# Patient Record
Sex: FEMALE | Race: WHITE | NOT HISPANIC OR LATINO | Employment: FULL TIME | URBAN - METROPOLITAN AREA
[De-identification: names, ages, dates, MRNs, and addresses within clinical notes are randomized per-mention and may not be internally consistent; named-entity substitution may affect disease eponyms.]

---

## 2018-11-12 ENCOUNTER — OFFICE VISIT (OUTPATIENT)
Dept: FAMILY MEDICINE | Facility: CLINIC | Age: 28
End: 2018-11-12
Payer: COMMERCIAL

## 2018-11-12 VITALS
HEART RATE: 68 BPM | RESPIRATION RATE: 14 BRPM | SYSTOLIC BLOOD PRESSURE: 106 MMHG | DIASTOLIC BLOOD PRESSURE: 64 MMHG | BODY MASS INDEX: 27.36 KG/M2 | TEMPERATURE: 98.2 F | WEIGHT: 176 LBS | OXYGEN SATURATION: 97 %

## 2018-11-12 DIAGNOSIS — Z00.00 ENCOUNTER FOR ROUTINE ADULT HEALTH EXAMINATION WITHOUT ABNORMAL FINDINGS: Primary | ICD-10-CM

## 2018-11-12 DIAGNOSIS — R41.840 INATTENTION: ICD-10-CM

## 2018-11-12 DIAGNOSIS — R45.86 MOOD SWINGS: ICD-10-CM

## 2018-11-12 DIAGNOSIS — Z12.4 SCREENING FOR CERVICAL CANCER: ICD-10-CM

## 2018-11-12 PROCEDURE — 87624 HPV HI-RISK TYP POOLED RSLT: CPT | Performed by: FAMILY MEDICINE

## 2018-11-12 PROCEDURE — 99395 PREV VISIT EST AGE 18-39: CPT | Performed by: FAMILY MEDICINE

## 2018-11-12 PROCEDURE — G0145 SCR C/V CYTO,THINLAYER,RESCR: HCPCS | Performed by: FAMILY MEDICINE

## 2018-11-12 RX ORDER — BUPROPION HYDROCHLORIDE 150 MG/1
150 TABLET, EXTENDED RELEASE ORAL DAILY
Qty: 30 TABLET | Refills: 6 | Status: SHIPPED | OUTPATIENT
Start: 2018-11-12 | End: 2020-10-28

## 2018-11-12 ASSESSMENT — ANXIETY QUESTIONNAIRES
7. FEELING AFRAID AS IF SOMETHING AWFUL MIGHT HAPPEN: NOT AT ALL
1. FEELING NERVOUS, ANXIOUS, OR ON EDGE: SEVERAL DAYS
5. BEING SO RESTLESS THAT IT IS HARD TO SIT STILL: NOT AT ALL
6. BECOMING EASILY ANNOYED OR IRRITABLE: MORE THAN HALF THE DAYS
GAD7 TOTAL SCORE: 6
2. NOT BEING ABLE TO STOP OR CONTROL WORRYING: NOT AT ALL
3. WORRYING TOO MUCH ABOUT DIFFERENT THINGS: SEVERAL DAYS

## 2018-11-12 ASSESSMENT — PATIENT HEALTH QUESTIONNAIRE - PHQ9
5. POOR APPETITE OR OVEREATING: MORE THAN HALF THE DAYS
SUM OF ALL RESPONSES TO PHQ QUESTIONS 1-9: 11
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
## 2018-11-12 NOTE — MR AVS SNAPSHOT
After Visit Summary   11/12/2018    Antonietta Roy    MRN: [id number]           Patient Information     Date Of Birth          1990        Visit Information        Provider Department      11/12/2018 10:00 AM Chalo Vo MD [facility]        Today's Diagnoses     Screening for cervical cancer    -  1    Mood swings        Inattention          Care Instructions      Preventive Health Recommendations  Female Ages 26 - 39  Yearly exam:   See your health care provider every year in order to    Review health changes.     Discuss preventive care.      Review your medicines if you your doctor has prescribed any.    Until age 30: Get a Pap test every three years (more often if you have had an abnormal result).    After age 30: Talk to your doctor about whether you should have a Pap test every 3 years or have a Pap test with HPV screening every 5 years.   You do not need a Pap test if your uterus was removed (hysterectomy) and you have not had cancer.  You should be tested each year for STDs (sexually transmitted diseases), if you're at risk.   Talk to your provider about how often to have your cholesterol checked.  If you are at risk for diabetes, you should have a diabetes test (fasting glucose).  Shots: Get a flu shot each year. Get a tetanus shot every 10 years.   Nutrition:     Eat at least 5 servings of fruits and vegetables each day.    Eat whole-grain bread, whole-wheat pasta and brown rice instead of white grains and rice.    Get adequate Calcium and Vitamin D.     Lifestyle    Exercise at least 150 minutes a week (30 minutes a day, 5 days of the week). This will help you control your weight and prevent disease.    Limit alcohol to one drink per day.    No smoking.     Wear sunscreen to prevent skin cancer.    See your dentist every six months for an exam and cleaning.            Follow-ups after your visit        Additional Services     MENTAL HEALTH REFERRAL  -  "Adult; Assessments and Testing; ADHD; FMG: [facility] [phone]; We will contact you to schedule the appointment or please call with any questions       All scheduling is subject to the client's specific insurance plan & benefits, provider/location availability, and provider clinical specialities.  Please arrive 15 minutes early for your first appointment and bring your completed paperwork.    Please be aware that coverage of these services is subject to the terms and limitations of your health insurance plan.  Call member services at your health plan with any benefit or coverage questions.                            Follow-up notes from your care team     Return in about 1 year (around 11/12/2019) for Physical Exam.      Who to contact     If you have questions or need follow up information about today's clinic visit or your schedule please contact [facility] directly at [phone].  Normal or non-critical lab and imaging results will be communicated to you by MyChart, letter or phone within 4 business days after the clinic has received the results. If you do not hear from us within 7 days, please contact the clinic through MyChart or phone. If you have a critical or abnormal lab result, we will notify you by phone as soon as possible.  Submit refill requests through [employer] or call your pharmacy and they will forward the refill request to us. Please allow 3 business days for your refill to be completed.          Additional Information About Your Visit        [employer] Information     [employer] lets you send messages to your doctor, view your test results, renew your prescriptions, schedule appointments and more. To sign up, go to [email or web address] . Click on \"Log in\" on the left side of the screen, which will take you to the Welcome page. Then click on \"Sign up Now\" on the right side of the page.     You will be asked to enter the access code listed below, as well as some " personal information. Please follow the directions to create your username and password.     Your access code is: [email or web address]  Expires: 2/10/2019  9:48 AM     Your access code will  in 90 days. If you need help or a new code, please call your [place] clinic or [phone].        Care EveryWhere ID     This is your Care EveryWhere ID. This could be used by other organizations to access your [place] medical records  [id number]        Your Vitals Were     Pulse Temperature Respirations Last Period Pulse Oximetry BMI (Body Mass Index)    68 98.2  F (36.8  C) (Tympanic) 14 10/29/2018 (Exact Date) 97% 27.36 kg/m2       Blood Pressure from Last 3 Encounters:   18 106/64   04/26/10 119/71   10/01/09 112/72    Weight from Last 3 Encounters:   18 176 lb (79.8 kg)   04/26/10 157 lb (71.2 kg) (86 %)*   10/01/09 164 lb 12.8 oz (74.8 kg) (91 %)*     * Growth percentiles are based on [facility] 2-20 Years data.              We Performed the Following     MENTAL HEALTH REFERRAL  - Adult; Assessments and Testing; ADHD; FMG: [facility] [phone]; We will contact you to schedule the appointment or please call with any questions     Pap imaged thin layer screen with HPV - recommended age 30 - 65 years (select HPV order below)          Today's Medication Changes          These changes are accurate as of 18 10:46 AM.  If you have any questions, ask your nurse or doctor.               Start taking these medicines.        Dose/Directions    buPROPion 150 MG 12 hr tablet   Commonly known as:  WELLBUTRIN SR   Used for:  Mood swings   Started by:  Chalo Vo MD        Dose:  150 mg   Take 1 tablet (150 mg) by mouth daily   Quantity:  30 tablet   Refills:  6            Where to get your medicines      These medications were sent to [id number] - [address] [facility], [facility] [address]     Phone:  [phone]      buPROPion 150 MG 12 hr tablet                Primary Care Provider Office Phone # Fax #    Francia Meyer, ABHI [phone]       XXX RESIGNED XXX [address] [facility] [address]        Equal Access to Services     BEHZAD FINLEY : Hadii aad ku hadasho Soomaali, waaxda luqadaha, qaybta kaalmada adeegyada, waxay idiin hayaan adeeg khbhavikjudi ramirez. So [facility] [phone].    ATENCIÓN: Si habla español, tiene a zimmer disposición servicios gratuitos de asistencia lingüística. Llame al [phone].    We comply with applicable federal civil rights laws and [place] laws. We do not discriminate on the basis of race, color, national origin, age, disability, sex, sexual orientation, or gender identity.            Thank you!     Thank you for choosing [facility]  for your care. Our goal is always to provide you with excellent care. Hearing back from our patients is one way we can continue to improve our services. Please take a few minutes to complete the written survey that you may receive in the mail after your visit with us. Thank you!             Your Updated Medication List - Protect others around you: Learn how to safely use, store and throw away your medicines at www.disposemymeds.org.          This list is accurate as of 11/12/18 10:46 AM.  Always use your most recent med list.                   Brand Name Dispense Instructions for use Diagnosis    buPROPion 150 MG 12 hr tablet    WELLBUTRIN SR    30 tablet    Take 1 tablet (150 mg) by mouth daily    Mood swings       NO ACTIVE MEDICATIONS      .

## 2018-11-12 NOTE — LETTER
November 20, 2018    Antonietta Roy  [address] [address]    Dear Antonietta,  We are happy to inform you that your PAP smear result from 11/12/18 is normal.  We are now able to do a follow up test on PAP smears. The DNA test is for HPV (Human Papilloma Virus). Cervical cancer is closely linked with certain types of HPV. Your results showed no evidence of high risk HPV.  Therefore we recommend you return in 3 years for your next pap smear.  You will still need to return to the clinic every year for an annual exam and other preventive tests.  If you have additional questions regarding this result, please call our registered nurse, Antonietta at [phone].  Sincerely,    Chalo Vo MD/bishnu

## 2018-11-13 ASSESSMENT — ANXIETY QUESTIONNAIRES: GAD7 TOTAL SCORE: 6

## 2018-11-14 LAB
COPATH REPORT: NORMAL
PAP: NORMAL

## 2018-11-18 LAB
FINAL DIAGNOSIS: NORMAL
HPV HR 12 DNA CVX QL NAA+PROBE: NEGATIVE
HPV16 DNA SPEC QL NAA+PROBE: NEGATIVE
HPV18 DNA SPEC QL NAA+PROBE: NEGATIVE
SPECIMEN DESCRIPTION: NORMAL
SPECIMEN SOURCE CVX/VAG CYTO: NORMAL

## 2019-01-07 ENCOUNTER — OFFICE VISIT (OUTPATIENT)
Dept: PSYCHOLOGY | Facility: CLINIC | Age: 29
End: 2019-01-07
Payer: COMMERCIAL

## 2019-01-07 DIAGNOSIS — F41.1 GENERALIZED ANXIETY DISORDER: Primary | ICD-10-CM

## 2019-01-07 PROCEDURE — 90834 PSYTX W PT 45 MINUTES: CPT | Performed by: PSYCHOLOGIST

## 2019-01-07 ASSESSMENT — ANXIETY QUESTIONNAIRES
IF YOU CHECKED OFF ANY PROBLEMS ON THIS QUESTIONNAIRE, HOW DIFFICULT HAVE THESE PROBLEMS MADE IT FOR YOU TO DO YOUR WORK, TAKE CARE OF THINGS AT HOME, OR GET ALONG WITH OTHER PEOPLE: SOMEWHAT DIFFICULT
7. FEELING AFRAID AS IF SOMETHING AWFUL MIGHT HAPPEN: NOT AT ALL
3. WORRYING TOO MUCH ABOUT DIFFERENT THINGS: SEVERAL DAYS
1. FEELING NERVOUS, ANXIOUS, OR ON EDGE: SEVERAL DAYS
6. BECOMING EASILY ANNOYED OR IRRITABLE: NEARLY EVERY DAY
5. BEING SO RESTLESS THAT IT IS HARD TO SIT STILL: NOT AT ALL
GAD7 TOTAL SCORE: 8
2. NOT BEING ABLE TO STOP OR CONTROL WORRYING: SEVERAL DAYS

## 2019-01-07 ASSESSMENT — PATIENT HEALTH QUESTIONNAIRE - PHQ9
5. POOR APPETITE OR OVEREATING: MORE THAN HALF THE DAYS
SUM OF ALL RESPONSES TO PHQ QUESTIONS 1-9: 8
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
## 2019-01-09 ASSESSMENT — ANXIETY QUESTIONNAIRES: GAD7 TOTAL SCORE: 8

## 2019-03-18 ENCOUNTER — OFFICE VISIT (OUTPATIENT)
Dept: PSYCHOLOGY | Facility: CLINIC | Age: 29
End: 2019-03-18
Payer: COMMERCIAL

## 2019-03-18 DIAGNOSIS — F41.1 GENERALIZED ANXIETY DISORDER: Primary | ICD-10-CM

## 2019-03-18 PROCEDURE — 90834 PSYTX W PT 45 MINUTES: CPT | Performed by: PSYCHOLOGIST

## 2019-03-21 NOTE — PROGRESS NOTES
Progress Note - Initial Session  Disclaimer: This note consists of symbols derived from keyboarding, dictation and/or voice recognition software. As a result, there may be errors in the script that have gone undetected. Please consider this when interpreting information found in this chart.    Client Name:  Antonietta Roy Date: 3/18/2019         Service Type: Individual  Video Visit: No     Session Start Time: 4:00 PM  session End Time: 4:45 PM     Session Length: 45    Session #: 1    Attendees: Client attended alone     DATA:  Client is referred by Aparna Berger PhD in order to complete ADHD evaluation.  See [facility] extended documentation dated 1/7/2019 for initial intake.  I will complete this and finish interpreting testing.   Interactive Complexity: No  Crisis: No    Intervention:  Psychological assessment    ASSESSMENT:  Mental Status Assessment:  Appearance:   Appropriate   Eye Contact:   Good   Psychomotor Behavior: Restless   Attitude:   Cooperative   Orientation:   All  Speech   Rate / Production: Normal    Volume:  Normal   Mood:    Normal  Affect:    Appropriate   Thought Content:  Clear   Thought Form:  Coherent  Logical   Insight:    Good       Safety Issues and Plan for Safety and Risk Management:  Client denies current fears or concerns for personal safety.  Client denies current or recent suicidal ideation or behaviors.  Client denies current or recent homicidal ideation or behaviors.  Client denies current or recent self injurious behavior or ideation.  Client denies other safety concerns.  A safety and risk management plan has not been developed at this time, however client was given the after-hours number / 911 should there be a change in any of these risk factors.  Client reports there are no firearms in the house.      Diagnostic Criteria:  A. Excessive anxiety and worry about a number of events or activities (such as work or school performance).    - Restlessness or feeling  keyed up or on edge.    - Irritability.    - Muscle tension.   D. The focus of the anxiety and worry is not confined to features of an Axis I disorder.  E. The anxiety, worry, or physical symptoms cause clinically significant distress or impairment in social, occupational, or other important areas of functioning.   F. The disturbance is not due to the direct physiological effects of a substance (e.g., a drug of abuse, a medication) or a general medical condition (e.g., hyperthyroidism) and does not occur exclusively during a Mood Disorder, a Psychotic Disorder, or a Pervasive Developmental Disorder.    - The aformentioned symptoms began in childhood year(s) ago and occurs 7 days per week and is experienced as moderate.      DSM5 Diagnoses: (Sustained by DSM5 Criteria Listed Above)  Diagnoses: 300.02 (F41.1) Generalized Anxiety Disorder  Psychosocial & Contextual Factors: Rule out PTSD, rule out ADHD; history of trauma; marital difficulties.  WHODAS 2.0 (12 item)           See [facility] documentation dated 1/7/2019  Collateral Reports Completed:  Authorization for Release of Information Completed for Antonietta Hobson      PLAN: (Homework, other):  Client stated that she may follow up for ongoing services with [facility].  Follow-up appointments have been set I will contact points previous therapist.      Maximo Mcdonald

## 2019-04-23 ENCOUNTER — OFFICE VISIT (OUTPATIENT)
Dept: PSYCHOLOGY | Facility: CLINIC | Age: 29
End: 2019-04-23
Payer: COMMERCIAL

## 2019-04-23 DIAGNOSIS — F41.1 GENERALIZED ANXIETY DISORDER: Primary | ICD-10-CM

## 2019-04-23 PROCEDURE — 96130 PSYCL TST EVAL PHYS/QHP 1ST: CPT | Performed by: PSYCHOLOGIST

## 2019-04-23 PROCEDURE — 90834 PSYTX W PT 45 MINUTES: CPT | Performed by: PSYCHOLOGIST

## 2019-04-23 NOTE — Clinical Note
I apologize, looking through my notes I note that she took Wellbutrin for 2 weeks and did not experience any noticeable relief.  I suggested that might have been too short of a trial.

## 2019-04-30 NOTE — PROGRESS NOTES
Progress Note  Disclaimer: This note consists of symbols derived from keyboarding, dictation and/or voice recognition software. As a result, there may be errors in the script that have gone undetected. Please consider this when interpreting information found in this chart.    Client Name: Antonietta Roy  Date: 4/23/2019         Service Type: Individual      Session Start Time: 3:00 PM  session End Time: 40 5 PM      Session Length: 45     Session #: 2     Attendees: Client attended alone    Treatment Plan Last Reviewed: 4/23/2019  PHQ-9 / ROSALVA-7 : See flow sheets     DATA   ADHD Assessment feedback session. Reviewed results of testing. See [facility] extended documentation for results. Explained diagnosis and treatment options.  Results of testing do not indicate ADHD.  Difficulties most likely arise from either generalized anxiety disorder or posttraumatic stress disorder.  Refer client to PCP for possible medication adjustment as well as back to individual therapy.   Progress Since Last Session (Related to Symptoms / Goals / Homework):   Symptoms: No change Stable    Homework: Achieved / completed to satisfaction      Episode of Care Goals: Satisfactory progress - ACTION (Actively working towards change); Intervened by reinforcing change plan / affirming steps taken     Current / Ongoing Stressors and Concerns:   Difficulty with attention and concentration     Treatment Objective(s) Addressed in This Session:   Reviewed results of testing, provided ADHD Psychoeducation tips and resources, encouraged client to make appointment for medication evaluation.       Intervention:   psychoeducation regarding ADHD        ASSESSMENT: Current Emotional / Mental Status (status of significant symptoms):   Risk status (Self / Other harm or suicidal ideation)   Client denies current fears or concerns for personal safety.   Client denies current or recent suicidal ideation or  behaviors.   Client denies current or recent homicidal ideation or behaviors.   Client denies current or recent self injurious behavior or ideation.   Client denies other safety concerns.   A safety and risk management plan has not been developed at this time, however client was given the after-hours number / 911 should there be a change in any of these risk factors.     Appearance:   Appropriate    Eye Contact:   Good    Psychomotor Behavior: Normal    Attitude:   Cooperative    Orientation:   All   Speech    Rate / Production: Normal     Volume:  Normal    Mood:    Normal   Affect:    Appropriate    Thought Content:  Clear    Thought Form:  Coherent  Logical    Insight:    Good      Medication Review:   No changes to current psychiatric medication(s)     Medication Compliance:   Yes     Changes in Health Issues:   None reported     Chemical Use Review:   Substance Use: Chemical use reviewed, no active concerns identified      Tobacco Use: No current tobacco use.       Collateral Reports Completed:   Not Applicable    PLAN: (Client Tasks / Therapist Tasks / Other)  Client to contact PCP and individual therapist.  I will remain available for consultation if needed.        Maximo Mcdonald

## 2020-10-28 ENCOUNTER — OFFICE VISIT (OUTPATIENT)
Dept: FAMILY MEDICINE | Facility: CLINIC | Age: 30
End: 2020-10-28
Payer: COMMERCIAL

## 2020-10-28 VITALS
WEIGHT: 176 LBS | SYSTOLIC BLOOD PRESSURE: 104 MMHG | HEART RATE: 62 BPM | TEMPERATURE: 98.2 F | BODY MASS INDEX: 26.67 KG/M2 | DIASTOLIC BLOOD PRESSURE: 64 MMHG | RESPIRATION RATE: 16 BRPM | HEIGHT: 68 IN

## 2020-10-28 DIAGNOSIS — Z31.69 INFERTILITY COUNSELING: ICD-10-CM

## 2020-10-28 DIAGNOSIS — Z00.00 ROUTINE GENERAL MEDICAL EXAMINATION AT A HEALTH CARE FACILITY: Primary | ICD-10-CM

## 2020-10-28 DIAGNOSIS — R45.86 MOOD SWINGS: ICD-10-CM

## 2020-10-28 PROCEDURE — 99395 PREV VISIT EST AGE 18-39: CPT | Performed by: FAMILY MEDICINE

## 2020-10-28 ASSESSMENT — ENCOUNTER SYMPTOMS
ABDOMINAL PAIN: 0
FEVER: 0
DIZZINESS: 0
BREAST MASS: 0
JOINT SWELLING: 0
DIARRHEA: 0
NERVOUS/ANXIOUS: 0
CONSTIPATION: 0
EYE PAIN: 0
WEAKNESS: 0
HEADACHES: 0
DYSURIA: 0
MYALGIAS: 0
SHORTNESS OF BREATH: 0
ARTHRALGIAS: 0
HEMATURIA: 0
SORE THROAT: 0
FREQUENCY: 0
PARESTHESIAS: 0
CHILLS: 0
PALPITATIONS: 0
HEARTBURN: 0
COUGH: 0
NAUSEA: 0
HEMATOCHEZIA: 0

## 2020-10-28 ASSESSMENT — ANXIETY QUESTIONNAIRES
7. FEELING AFRAID AS IF SOMETHING AWFUL MIGHT HAPPEN: NOT AT ALL
6. BECOMING EASILY ANNOYED OR IRRITABLE: NEARLY EVERY DAY
3. WORRYING TOO MUCH ABOUT DIFFERENT THINGS: SEVERAL DAYS
5. BEING SO RESTLESS THAT IT IS HARD TO SIT STILL: MORE THAN HALF THE DAYS
GAD7 TOTAL SCORE: 12
2. NOT BEING ABLE TO STOP OR CONTROL WORRYING: SEVERAL DAYS
1. FEELING NERVOUS, ANXIOUS, OR ON EDGE: MORE THAN HALF THE DAYS

## 2020-10-28 ASSESSMENT — PATIENT HEALTH QUESTIONNAIRE - PHQ9
SUM OF ALL RESPONSES TO PHQ QUESTIONS 1-9: 12
5. POOR APPETITE OR OVEREATING: NEARLY EVERY DAY

## 2020-10-28 ASSESSMENT — MIFFLIN-ST. JEOR: SCORE: 1558.89

## 2020-10-28 NOTE — NURSING NOTE
"Chief Complaint   Patient presents with     Physical       Initial /64   Pulse 62   Temp 98.2  F (36.8  C) (Tympanic)   Resp 16   Ht 1.715 m (5' 7.5\")   Wt 79.8 kg (176 lb)   LMP 10/20/2020   Breastfeeding No   BMI 27.16 kg/m   Estimated body mass index is 27.16 kg/m  as calculated from the following:    Height as of this encounter: 1.715 m (5' 7.5\").    Weight as of this encounter: 79.8 kg (176 lb).    Patient presents to the clinic using     Health Maintenance that is potentially due pending provider review:          Is there anyone who you would like to be able to receive your results?   If yes have patient fill out JEAN CARLOS    "
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
## 2020-10-29 ASSESSMENT — ANXIETY QUESTIONNAIRES: GAD7 TOTAL SCORE: 12

## 2021-01-15 ENCOUNTER — NURSE TRIAGE (OUTPATIENT)
Dept: NURSING | Facility: CLINIC | Age: 31
End: 2021-01-15
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
## 2021-02-07 ENCOUNTER — HEALTH MAINTENANCE LETTER (OUTPATIENT)
Age: 31
End: 2021-02-07

## 2021-09-12 ENCOUNTER — HEALTH MAINTENANCE LETTER (OUTPATIENT)
Age: 31
End: 2021-09-12

## 2022-01-02 ENCOUNTER — HEALTH MAINTENANCE LETTER (OUTPATIENT)
Age: 32
End: 2022-01-02

## 2022-11-19 ENCOUNTER — HEALTH MAINTENANCE LETTER (OUTPATIENT)
Age: 32
End: 2022-11-19

## 2023-04-09 ENCOUNTER — HEALTH MAINTENANCE LETTER (OUTPATIENT)
Age: 33
End: 2023-04-09

## 2023-11-09 ENCOUNTER — OFFICE VISIT (OUTPATIENT)
Dept: FAMILY MEDICINE | Facility: CLINIC | Age: 33
End: 2023-11-09
Payer: COMMERCIAL

## 2023-11-09 VITALS
TEMPERATURE: 97.4 F | HEIGHT: 68 IN | OXYGEN SATURATION: 97 % | DIASTOLIC BLOOD PRESSURE: 89 MMHG | BODY MASS INDEX: 31.46 KG/M2 | RESPIRATION RATE: 20 BRPM | SYSTOLIC BLOOD PRESSURE: 130 MMHG | HEART RATE: 81 BPM | WEIGHT: 207.6 LBS

## 2023-11-09 DIAGNOSIS — R11.0 NAUSEA: ICD-10-CM

## 2023-11-09 DIAGNOSIS — R00.0 RACING HEART BEAT: ICD-10-CM

## 2023-11-09 DIAGNOSIS — R42 DIZZINESS: Primary | ICD-10-CM

## 2023-11-09 PROBLEM — F41.9 ANXIETY AND DEPRESSION: Status: ACTIVE | Noted: 2022-07-28

## 2023-11-09 PROBLEM — F32.A ANXIETY AND DEPRESSION: Status: ACTIVE | Noted: 2022-07-28

## 2023-11-09 LAB
ALBUMIN SERPL BCG-MCNC: 4.7 G/DL (ref 3.5–5.2)
ALBUMIN UR-MCNC: 30 MG/DL
ALP SERPL-CCNC: 55 U/L (ref 35–104)
ALT SERPL W P-5'-P-CCNC: 7 U/L (ref 0–50)
ANION GAP SERPL CALCULATED.3IONS-SCNC: 12 MMOL/L (ref 7–15)
APPEARANCE UR: CLEAR
AST SERPL W P-5'-P-CCNC: 20 U/L (ref 0–45)
BACTERIA #/AREA URNS HPF: ABNORMAL /HPF
BILIRUB SERPL-MCNC: 0.8 MG/DL
BILIRUB UR QL STRIP: ABNORMAL
BUN SERPL-MCNC: 6.1 MG/DL (ref 6–20)
CALCIUM SERPL-MCNC: 9.9 MG/DL (ref 8.6–10)
CHLORIDE SERPL-SCNC: 103 MMOL/L (ref 98–107)
COLOR UR AUTO: YELLOW
CREAT SERPL-MCNC: 0.92 MG/DL (ref 0.51–0.95)
DEPRECATED HCO3 PLAS-SCNC: 23 MMOL/L (ref 22–29)
EGFRCR SERPLBLD CKD-EPI 2021: 84 ML/MIN/1.73M2
ERYTHROCYTE [DISTWIDTH] IN BLOOD BY AUTOMATED COUNT: 12.5 % (ref 10–15)
FERRITIN SERPL-MCNC: 55 NG/ML (ref 6–175)
GLUCOSE SERPL-MCNC: 123 MG/DL (ref 70–99)
GLUCOSE UR STRIP-MCNC: NEGATIVE MG/DL
HBA1C MFR BLD: 5.2 % (ref 0–5.6)
HCG UR QL: NEGATIVE
HCT VFR BLD AUTO: 44.5 % (ref 35–47)
HGB BLD-MCNC: 15.1 G/DL (ref 11.7–15.7)
HGB UR QL STRIP: ABNORMAL
IRON BINDING CAPACITY (ROCHE): 285 UG/DL (ref 240–430)
IRON SATN MFR SERPL: 57 % (ref 15–46)
IRON SERPL-MCNC: 162 UG/DL (ref 37–145)
KETONES UR STRIP-MCNC: 40 MG/DL
LEUKOCYTE ESTERASE UR QL STRIP: NEGATIVE
MCH RBC QN AUTO: 29.7 PG (ref 26.5–33)
MCHC RBC AUTO-ENTMCNC: 33.9 G/DL (ref 31.5–36.5)
MCV RBC AUTO: 87 FL (ref 78–100)
MUCOUS THREADS #/AREA URNS LPF: PRESENT /LPF
NITRATE UR QL: NEGATIVE
PH UR STRIP: 6 [PH] (ref 5–7)
PLATELET # BLD AUTO: 260 10E3/UL (ref 150–450)
POTASSIUM SERPL-SCNC: 3.9 MMOL/L (ref 3.4–5.3)
PROT SERPL-MCNC: 7.8 G/DL (ref 6.4–8.3)
RBC # BLD AUTO: 5.09 10E6/UL (ref 3.8–5.2)
RBC #/AREA URNS AUTO: ABNORMAL /HPF
SODIUM SERPL-SCNC: 138 MMOL/L (ref 135–145)
SP GR UR STRIP: >=1.03 (ref 1–1.03)
TSH SERPL DL<=0.005 MIU/L-ACNC: 1.08 UIU/ML (ref 0.3–4.2)
UROBILINOGEN UR STRIP-ACNC: 0.2 E.U./DL
WBC # BLD AUTO: 13.8 10E3/UL (ref 4–11)
WBC #/AREA URNS AUTO: ABNORMAL /HPF

## 2023-11-09 PROCEDURE — 84443 ASSAY THYROID STIM HORMONE: CPT | Performed by: STUDENT IN AN ORGANIZED HEALTH CARE EDUCATION/TRAINING PROGRAM

## 2023-11-09 PROCEDURE — 81001 URINALYSIS AUTO W/SCOPE: CPT | Performed by: STUDENT IN AN ORGANIZED HEALTH CARE EDUCATION/TRAINING PROGRAM

## 2023-11-09 PROCEDURE — 99214 OFFICE O/P EST MOD 30 MIN: CPT | Performed by: STUDENT IN AN ORGANIZED HEALTH CARE EDUCATION/TRAINING PROGRAM

## 2023-11-09 PROCEDURE — 83550 IRON BINDING TEST: CPT | Performed by: STUDENT IN AN ORGANIZED HEALTH CARE EDUCATION/TRAINING PROGRAM

## 2023-11-09 PROCEDURE — 93000 ELECTROCARDIOGRAM COMPLETE: CPT | Performed by: STUDENT IN AN ORGANIZED HEALTH CARE EDUCATION/TRAINING PROGRAM

## 2023-11-09 PROCEDURE — 36415 COLL VENOUS BLD VENIPUNCTURE: CPT | Performed by: STUDENT IN AN ORGANIZED HEALTH CARE EDUCATION/TRAINING PROGRAM

## 2023-11-09 PROCEDURE — 85027 COMPLETE CBC AUTOMATED: CPT | Performed by: STUDENT IN AN ORGANIZED HEALTH CARE EDUCATION/TRAINING PROGRAM

## 2023-11-09 PROCEDURE — 80053 COMPREHEN METABOLIC PANEL: CPT | Performed by: STUDENT IN AN ORGANIZED HEALTH CARE EDUCATION/TRAINING PROGRAM

## 2023-11-09 PROCEDURE — 81025 URINE PREGNANCY TEST: CPT | Performed by: STUDENT IN AN ORGANIZED HEALTH CARE EDUCATION/TRAINING PROGRAM

## 2023-11-09 PROCEDURE — 83036 HEMOGLOBIN GLYCOSYLATED A1C: CPT | Performed by: STUDENT IN AN ORGANIZED HEALTH CARE EDUCATION/TRAINING PROGRAM

## 2023-11-09 PROCEDURE — 82728 ASSAY OF FERRITIN: CPT | Performed by: STUDENT IN AN ORGANIZED HEALTH CARE EDUCATION/TRAINING PROGRAM

## 2023-11-09 PROCEDURE — 83540 ASSAY OF IRON: CPT | Performed by: STUDENT IN AN ORGANIZED HEALTH CARE EDUCATION/TRAINING PROGRAM

## 2023-11-09 RX ORDER — BUSPIRONE HYDROCHLORIDE 15 MG/1
1 TABLET ORAL
COMMUNITY
Start: 2023-10-30 | End: 2023-11-10

## 2023-11-09 RX ORDER — ONDANSETRON 4 MG/1
4 TABLET, ORALLY DISINTEGRATING ORAL EVERY 8 HOURS PRN
Qty: 15 TABLET | Refills: 0 | Status: SHIPPED | OUTPATIENT
Start: 2023-11-09

## 2023-11-09 RX ORDER — CLONAZEPAM 0.5 MG/1
TABLET ORAL
COMMUNITY
Start: 2023-10-30 | End: 2023-11-10

## 2023-11-09 RX ORDER — HYDROXYZINE HYDROCHLORIDE 10 MG/1
10-20 TABLET, FILM COATED ORAL
COMMUNITY
Start: 2022-10-31 | End: 2023-11-09

## 2023-11-09 RX ORDER — PROPRANOLOL HYDROCHLORIDE 10 MG/1
TABLET ORAL
COMMUNITY
Start: 2023-07-03 | End: 2023-11-09

## 2023-11-09 RX ORDER — TRAZODONE HYDROCHLORIDE 50 MG/1
50-100 TABLET, FILM COATED ORAL AT BEDTIME
COMMUNITY
Start: 2023-02-08 | End: 2023-11-09

## 2023-11-09 RX ORDER — BUSPIRONE HYDROCHLORIDE 10 MG/1
TABLET ORAL
COMMUNITY
Start: 2023-11-08 | End: 2023-11-09

## 2023-11-09 RX ORDER — FLUVOXAMINE MALEATE 50 MG
50 TABLET ORAL AT BEDTIME
COMMUNITY
Start: 2023-04-06 | End: 2023-11-09

## 2023-11-09 ASSESSMENT — PAIN SCALES - GENERAL: PAINLEVEL: NO PAIN (0)

## 2023-11-09 NOTE — PROGRESS NOTES
Assessment & Plan     Dizziness  Racing heart beat  Racing heart beat, dizziness, decreased appetite w/nausea for 3 days. Having worsening anxiety/panic attacks that she is working with her psychiatrist on. Recently increased buspar dose and added PRN klonopin. Denies CP, SOB, fever/chills, ab pain, urinary symptoms. Vitals and exam WNL-no tachycardia or low O2. Differential includes: anxiety/panic attacks, thyroid, diabetes, electrolyte abnormality, pregnancy, UTI, anemia, ect...     EKG NSR. Labs ordered below. Recommend increased fluids. Try small/bland meals. Zofran given for nausea. Will schedule zio patch if symptoms not improving. Strong suspicion this is anxiety-advised to follow up with psychiatrist regarding medications.    - TSH with free T4 reflex  - CBC with platelets  - Ferritin  - Iron and iron binding capacity  - Comprehensive metabolic panel  - Hemoglobin A1c  - EKG 12-lead complete w/read - Clinics  - HCG qualitative urine  - UA Macroscopic with reflex to Microscopic and Culture  - zio patch      Christopher Mckeon DO  [facility]    Angel Mane is a 33 year old, presenting for the following health issues:  Dizziness (Light headed , fatigue, and not having appetite.)        11/9/2023     8:02 AM   Additional Questions   Roomed by Stella esparza   Accompanied by liliana dawn       History of Present Illness       Reason for visit:  Racing heart, fatigue,loss of appetite,dizziness\lightheadedness  Symptom onset:  1-3 days ago    She eats 2-3 servings of fruits and vegetables daily.She consumes 0 sweetened beverage(s) daily.She exercises with enough effort to increase her heart rate 10 to 19 minutes per day.  She exercises with enough effort to increase her heart rate 3 or less days per week.   She is taking medications regularly.     Racing heart beat for last few days (3 days ago)  Spontaneous, no known trigger  Severe dizziness, constant  Not comfortable  "walking  Notices this sitting, standing, walking  Dec appetite  Sensation of eating causes gagging/nausea  Fatigue  Cold a few weeks ago  Maybe some diarrhea, mild    Takes buspar (recently inc from 10-15mg); not pleasant (however has tried many meds in the past)  -seeing psychiatrist, saw recently    Klonopin (not regularly, PRN); took one yesterday, did not help    Chest feels heavy  Denies extra beats, SOB, headaches, vomiting, ab pain, constipation, urinary symptoms      Review of Systems   Constitutional, HEENT, cardiovascular, pulmonary, gi and gu systems are negative, except as otherwise noted.      Objective    /89 (BP Location: Right arm, Patient Position: Sitting, Cuff Size: Adult Regular)   Pulse 81   Temp 97.4  F (36.3  C) (Temporal)   Resp 20   Ht 1.725 m (5' 7.9\")   Wt 94.2 kg (207 lb 9.6 oz)   LMP 10/29/2023 (Exact Date)   SpO2 97%   BMI 31.66 kg/m    Body mass index is 31.66 kg/m .    Physical Exam  Constitutional:       Appearance: Normal appearance.   HENT:      Right Ear: Tympanic membrane, ear canal and external ear normal.      Left Ear: Tympanic membrane, ear canal and external ear normal.      Mouth/Throat:      Mouth: Mucous membranes are moist.      Pharynx: Oropharynx is clear.   Eyes:      Extraocular Movements: Extraocular movements intact.      Conjunctiva/sclera: Conjunctivae normal.      Pupils: Pupils are equal, round, and reactive to light.   Cardiovascular:      Rate and Rhythm: Normal rate and regular rhythm.      Pulses: Normal pulses.      Heart sounds: Normal heart sounds. No murmur heard.  Pulmonary:      Effort: Pulmonary effort is normal. No respiratory distress.      Breath sounds: Normal breath sounds. No wheezing or rales.   Abdominal:      General: Abdomen is flat. There is no distension.      Palpations: Abdomen is soft.      Tenderness: There is no abdominal tenderness. There is no guarding or rebound.      Hernia: No hernia is present.   Musculoskeletal: "      Cervical back: Normal range of motion and neck supple. No rigidity.   Lymphadenopathy:      Cervical: No cervical adenopathy.   Skin:     Findings: No rash.   Neurological:      General: No focal deficit present.      Mental Status: She is alert and oriented to person, place, and time.   Psychiatric:         Attention and Perception: Attention normal.         Speech: Speech normal.         Thought Content: Thought content normal.         Cognition and Memory: Cognition normal.         Judgment: Judgment normal.      Comments: Kind, pleasant, cooperative. Tearful at times. Anxious and worried.             EKG Interpretation:      Interpreted by Christopher Mckeon DO  Symptoms at time of EKG: racing heart   Rhythm: Normal sinus   Rate: Normal  Axis: Normal  Ectopy: None  Conduction: Normal  ST Segments/ T Waves: No ST-T wave changes and No acute ischemic changes  Q Waves: None  Comparison to prior: No old EKG available  Clinical Impression: normal EKG

## 2023-11-09 NOTE — PATIENT INSTRUCTIONS
Your dizziness could be from anxiety/panic attacks, however let's make sure nothing else is going on. We will check labs for urine infection, pregnancy, anemia, thyroid abnormality, electrolyte abnormality, diabetes. I will mychart you when this is back.    Schedule the zio patch. This monitors the rhythm of your heart constantly for a few days. I will mychart you when this is completed.    Work on increasing fluids. Try eating small/bland meals. Move slowly from sit-stand. Rest. Follow up with psychiatrist if panic attacks are not getting better. You can always go to the ED if your symptoms are worsening, not improving.      Dr. Mckeon

## 2023-11-10 ENCOUNTER — NURSE TRIAGE (OUTPATIENT)
Dept: NURSING | Facility: CLINIC | Age: 33
End: 2023-11-10
Payer: COMMERCIAL

## 2023-11-10 ENCOUNTER — HOSPITAL ENCOUNTER (EMERGENCY)
Facility: CLINIC | Age: 33
Discharge: HOME | End: 2023-11-10
Attending: EMERGENCY MEDICINE | Admitting: EMERGENCY MEDICINE
Payer: COMMERCIAL

## 2023-11-10 VITALS
WEIGHT: 210 LBS | HEART RATE: 98 BPM | DIASTOLIC BLOOD PRESSURE: 95 MMHG | TEMPERATURE: 98 F | HEIGHT: 68 IN | OXYGEN SATURATION: 98 % | RESPIRATION RATE: 18 BRPM | SYSTOLIC BLOOD PRESSURE: 139 MMHG | BODY MASS INDEX: 31.83 KG/M2

## 2023-11-10 DIAGNOSIS — F41.9 ANXIETY: ICD-10-CM

## 2023-11-10 DIAGNOSIS — F41.0 PANIC ATTACK: ICD-10-CM

## 2023-11-10 LAB
ANION GAP SERPL CALCULATED.3IONS-SCNC: 13 MMOL/L (ref 7–15)
ATRIAL RATE - MUSE: 73 BPM
BASOPHILS # BLD AUTO: 0 10E3/UL (ref 0–0.2)
BASOPHILS NFR BLD AUTO: 0 %
BUN SERPL-MCNC: 7.3 MG/DL (ref 6–20)
CALCIUM SERPL-MCNC: 9.8 MG/DL (ref 8.6–10)
CHLORIDE SERPL-SCNC: 101 MMOL/L (ref 98–107)
CREAT SERPL-MCNC: 0.9 MG/DL (ref 0.51–0.95)
DEPRECATED HCO3 PLAS-SCNC: 24 MMOL/L (ref 22–29)
DIASTOLIC BLOOD PRESSURE - MUSE: NORMAL MMHG
EGFRCR SERPLBLD CKD-EPI 2021: 86 ML/MIN/1.73M2
EOSINOPHIL # BLD AUTO: 0.1 10E3/UL (ref 0–0.7)
EOSINOPHIL NFR BLD AUTO: 1 %
ERYTHROCYTE [DISTWIDTH] IN BLOOD BY AUTOMATED COUNT: 12.3 % (ref 10–15)
GLUCOSE SERPL-MCNC: 118 MG/DL (ref 70–99)
HCT VFR BLD AUTO: 45.1 % (ref 35–47)
HGB BLD-MCNC: 15.3 G/DL (ref 11.7–15.7)
IMM GRANULOCYTES # BLD: 0.1 10E3/UL
IMM GRANULOCYTES NFR BLD: 1 %
INTERPRETATION ECG - MUSE: NORMAL
LYMPHOCYTES # BLD AUTO: 1.7 10E3/UL (ref 0.8–5.3)
LYMPHOCYTES NFR BLD AUTO: 17 %
MCH RBC QN AUTO: 29.8 PG (ref 26.5–33)
MCHC RBC AUTO-ENTMCNC: 33.9 G/DL (ref 31.5–36.5)
MCV RBC AUTO: 88 FL (ref 78–100)
MONOCYTES # BLD AUTO: 0.6 10E3/UL (ref 0–1.3)
MONOCYTES NFR BLD AUTO: 6 %
NEUTROPHILS # BLD AUTO: 7.2 10E3/UL (ref 1.6–8.3)
NEUTROPHILS NFR BLD AUTO: 75 %
NRBC # BLD AUTO: 0 10E3/UL
NRBC BLD AUTO-RTO: 0 /100
P AXIS - MUSE: 82 DEGREES
PLATELET # BLD AUTO: 273 10E3/UL (ref 150–450)
POTASSIUM SERPL-SCNC: 3.8 MMOL/L (ref 3.4–5.3)
PR INTERVAL - MUSE: 132 MS
QRS DURATION - MUSE: 80 MS
QT - MUSE: 390 MS
QTC - MUSE: 429 MS
R AXIS - MUSE: 79 DEGREES
RBC # BLD AUTO: 5.14 10E6/UL (ref 3.8–5.2)
SODIUM SERPL-SCNC: 138 MMOL/L (ref 135–145)
SYSTOLIC BLOOD PRESSURE - MUSE: NORMAL MMHG
T AXIS - MUSE: 39 DEGREES
TROPONIN T SERPL HS-MCNC: <6 NG/L
VENTRICULAR RATE- MUSE: 73 BPM
WBC # BLD AUTO: 9.7 10E3/UL (ref 4–11)

## 2023-11-10 PROCEDURE — 250N000013 HC RX MED GY IP 250 OP 250 PS 637: Performed by: PSYCHIATRY & NEUROLOGY

## 2023-11-10 PROCEDURE — 80048 BASIC METABOLIC PNL TOTAL CA: CPT | Performed by: EMERGENCY MEDICINE

## 2023-11-10 PROCEDURE — 84484 ASSAY OF TROPONIN QUANT: CPT | Performed by: EMERGENCY MEDICINE

## 2023-11-10 PROCEDURE — 85025 COMPLETE CBC W/AUTO DIFF WBC: CPT | Performed by: EMERGENCY MEDICINE

## 2023-11-10 PROCEDURE — 99285 EMERGENCY DEPT VISIT HI MDM: CPT

## 2023-11-10 PROCEDURE — 36415 COLL VENOUS BLD VENIPUNCTURE: CPT | Performed by: EMERGENCY MEDICINE

## 2023-11-10 PROCEDURE — 93005 ELECTROCARDIOGRAM TRACING: CPT

## 2023-11-10 PROCEDURE — 99215 OFFICE O/P EST HI 40 MIN: CPT | Performed by: PSYCHIATRY & NEUROLOGY

## 2023-11-10 RX ORDER — LORAZEPAM 0.5 MG/1
.5-1 TABLET ORAL 2 TIMES DAILY PRN
Status: DISCONTINUED | OUTPATIENT
Start: 2023-11-10 | End: 2023-11-10 | Stop reason: HOSPADM

## 2023-11-10 RX ORDER — PROPRANOLOL HYDROCHLORIDE 10 MG/1
10 TABLET ORAL 2 TIMES DAILY
COMMUNITY
End: 2023-11-10

## 2023-11-10 RX ORDER — PROPRANOLOL HYDROCHLORIDE 10 MG/1
10 TABLET ORAL 2 TIMES DAILY
Qty: 60 TABLET | Refills: 0 | Status: SHIPPED | OUTPATIENT
Start: 2023-11-10 | End: 2023-11-14

## 2023-11-10 RX ORDER — PROPRANOLOL HYDROCHLORIDE 10 MG/1
10 TABLET ORAL 2 TIMES DAILY
Status: DISCONTINUED | OUTPATIENT
Start: 2023-11-10 | End: 2023-11-10 | Stop reason: HOSPADM

## 2023-11-10 RX ORDER — LORAZEPAM 1 MG/1
1 TABLET ORAL DAILY PRN
Qty: 15 TABLET | Refills: 0 | Status: SHIPPED | OUTPATIENT
Start: 2023-11-10

## 2023-11-10 RX ORDER — LORAZEPAM 1 MG/1
1 TABLET ORAL ONCE
Status: COMPLETED | OUTPATIENT
Start: 2023-11-10 | End: 2023-11-10

## 2023-11-10 RX ADMIN — LORAZEPAM 1 MG: 1 TABLET ORAL at 11:56

## 2023-11-10 RX ADMIN — PROPRANOLOL HYDROCHLORIDE 10 MG: 10 TABLET ORAL at 11:56

## 2023-11-10 ASSESSMENT — ACTIVITIES OF DAILY LIVING (ADL)
ADLS_ACUITY_SCORE: 35

## 2023-11-10 NOTE — ED TRIAGE NOTES
Pt was recently started on buprion  Pt has underly mental health  Pt thinks these are side effects   Pt was seen at [facility]  Pt was referred to come here for empath       Contracts for safety

## 2023-11-10 NOTE — CONSULTS
DEC Consult Order placed. DEC assessment completed by Alejandra on 11/10. Consult acknowledged and completed.     Denice Ralph LPCC
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
## 2023-11-10 NOTE — PROGRESS NOTES
Pt reports Ativan and propranolol were helpful in reducing anxiety. States the tight feeling in her chest has resolved.

## 2023-11-10 NOTE — DISCHARGE INSTRUCTIONS
You were scheduled a therapy appointment:  Date: Tuesday, 11/21/2023  Time: 9:00 am - 10:00 am  Provider: Muriel Lorenzo  Location: [facility], [address], [address], [place] [address]  Phone: [phone]  Type: Therapy - Initial (In-Person)    You were scheduled a psychiatry appointment  Date: Friday, 11/17/2023  Time: 9:00 am - 10:00 am  Provider: Ty Bynum  MSN  CNP,RN  Location: [facility], [address] [place] [address], [address], [place] [address]  Phone: [phone]  Type: Medication Mgmt - Initial (In-Person)    Patient Instructions  Please fill New Patient Form by using following link. All forms need to be completed 24hours prior to the appointment date/time by going to [email or web address] Please call us on [phone] 24 hours prior to your scheduled appointment to confirm that you are able to attend.    It is your responsibility to contact your insurance company directly to verify coverage, eligibility, payment, and benefit information for any appointments or referrals listed above. [facility] maintains an extensive network of licensed behavioral health providers to connect patients with the services they need. We do not charge providers a fee to participate in our referral network. We match patients with providers based on a patient's specific treatment needs, insurance coverage, and location. Our first effort will be to refer you to a provider within your care system, and will utilize providers outside your care system as needed    Aftercare Plan    Follow up with established providers and supports as scheduled. Continue taking medications as prescribed. Abstain from drugs and alcohol. Utilize your [facility] mental health crisis team as needed. They are available 24/7. Contact information is listed below.     If I am feeling unsafe or I am in a crisis, I will:   Contact my established care providers   Call the National Suicide Prevention Lifeline: 181.377.4370   Go to  the nearest emergency room   Call 911     Warning signs that I or other people might notice when a crisis is developing for me: changes to sleep, appetite or mood, increased anger, agitation or irritability, feeling depressed or hopeless, spending more time alone or talking less, increased crying, decreased productivity, seeing or hearing things that aren't there, thoughts of not wanting to live anymore or of actually killing myself, thoughts of hurting others    Things I am able to do on my own to cope or help me feel better: watching a favorite tv show or movie, listening to music I enjoy, going outside and breathing fresh air, going for a walk or exercising, taking a shower or bath, a cold or hot beverage, a healthy snack, drawing/coloring/painting, journaling, singing or dancing, deep breathing     I can try practicing square breathing when I begin to feel anxious - inhale through the nose for the count of 4 and the first line on the square. Exhale through the mouth for the count of 4 for the second line of the square. Repeat to complete the square. Repeat the square as many times as needed.    I can also use my five senses to practice mindfulness and grounding. What are five things I can see, four things I can hear, three things I can feel, two things I can smell, and one thing I can taste.     Things that I am able to do with others to cope or help me feel better: sometimes just talking or spending time with someone else, sharing a meal or having coffee, watching a movie or playing a game, going for a walk or exercising    I can also use community resources including mental health hotlines, [facility] crisis teams, or apps.     Things I can use or do for distraction: movies/tv, music, reading, games, drawing/coloring/painting or other art, essential oils, exercise, cleaning/organizing, puzzles, crossword puzzles, word search, Sudoku       I can also download a meditation or relaxation antwon, like Calm, Headspace, or  "Insight Timer (all three offer a free version)    A great website resource is Change to Chill with active coping skills    Changes I can make to support my mental health and wellness: Attend scheduled mental health therapy and psychiatric appointments. Take my medications as prescribed. Maintain a daily schedule/routine. Abstain from all mood altering substances, including drugs, alcohol, or medications not currently prescribed to me. Implement a self-care routine.      People in my life that I can ask for help: friends or family, trusted teachers/staff/colleagues, trusted members of my community or place of [employer], mental health crisis lines, or 911    Your [facility] has a mental health crisis team you can call 24/7: [facility], [phone]    Other things that are important when I m in crisis: to remember that the feelings I am having right now are temporary, and it won't feel like this forever, and that it is okay and important to ask for help    Crisis Lines  Crisis Text Line  Text 159964  You will be connected with a trained live crisis counselor to provide support.    Por espanol, texto  MACO a 475540 o texto a 442-AYUDAME en WhatsApp    National Hope Line  1.800.SUICIDE [6385173]      Community Resources  Fast Tracker  Linking people to mental health and substance use disorder resources  EventWithckU-Subs Delin.org     [place] Mental Health Warm Line  Peer to peer support  Monday thru Saturday, 12 pm to 10 pm  808.838.5667 or 2.939.551.3513  Text \"Support\" to 19754    National [place] on Mental Illness (NANNETTE)  866.637.1285 or 1.888.NANNETTE.HELPS      Mental Health Apps  My3  https://my3app.org/    VirtualHopeBox  https://[employer]de.org/apps/virtual-hope-box/      Additional Information  Today you were seen by a licensed mental health professional through Triage and Transition services, [facility] ([facility])  for a crisis assessment in the Emergency Department at [facility].  " It is recommended that you follow up with your established providers (psychiatrist, mental health therapist, and/or primary care doctor - as relevant) as soon as possible. Coordinators from [facility] will be calling you in the next 24-48 hours to ensure that you have the resources you need.  You can also contact [facility] coordinators directly at [phone]. You may have been scheduled for or offered an appointment with a mental health provider. [facility] maintains an extensive network of licensed behavioral health providers to connect patients with the services they need.  We do not charge providers a fee to participate in our referral network.  We match patients with providers based on a patient's specific needs, insurance coverage, and location.  Our first effort will be to refer you to a provider within your care system, and will utilize providers outside your care system as needed.
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
## 2023-11-10 NOTE — PROGRESS NOTES
Patient agreed and can teach back the discharge plan. Discharge instructions reviewed with patient including follow-up care plan. Reviewed new and discontinued medications. Safety plan and outpatient resources. Denies SI,HI and hallucinations. All belongings that were brought into the hospital have been returned to patient. Escorted off the unit at door 6 accompanied by [facility] staff. Discharged to home with .

## 2023-11-10 NOTE — TELEPHONE ENCOUNTER
Nurse Triage SBAR    Is this a 2nd Level Triage? NO    Situation: Panic attack    Background: Extended panic attack for the last 4 days. Was seen yesterday and they completed extensive testing, all was negative; she was instructed to visit with her psychologist. Her psychologist instructed her to go back to the ED. Reports the physician she saw said there was nothing she could do. Patient reports she and her  argued significantly last week and he had an issue with his health that turned out to be nothing, her psychiatrist also increased her medication last week and she wonders if that is what caused this change?     Assessment: Patient says she is unable to eat, it is becoming difficult to breath, heart is racing. Reports constant dizziness, unable to care for kids. Extreme fatigue.     Protocol Recommended Disposition:   Go to ED Now    Recommendation:  Recommended patient go to the Emergency Department at [facility] to the [facility] Unit. Patient's  will drive.     Missy Niño RN on 11/10/2023 at 5:31 AM      Reason for Disposition   [1] Lightheadedness or dizziness AND [2] persists > 10 minutes AND [3] not relieved by reassurance provided by triager    Additional Information   Negative: SEVERE difficulty breathing (e.g., struggling for each breath, speaks in single words)   Negative: Bluish (or gray) lips or face now   Negative: Difficult to awaken or acting confused (e.g., disoriented, slurred speech)   Negative: Violent behavior, or threatening to physically hurt or kill someone   Negative: Sounds like a life-threatening emergency to the triager   Negative: Chest pain   Negative: Palpitations, skipped heartbeat, or rapid heartbeat is main symptom   Negative: Cough is main symptom   Negative: Suicide thoughts, threats, attempts, or questions   Negative: Depression is main problem or symptom (e.g., feelings of sadness or hopelessness)   Negative: [1] Difficulty breathing AND [2] persists > 10  minutes AND [3] not relieved by reassurance provided by triager    Protocols used: Anxiety and Panic Attack-A-AH

## 2023-11-10 NOTE — PROGRESS NOTES
"33 year old female with history of anxiety received from ED due to increased anxiety, emotional lability. Reports she was recently started on Bupropion and since then has experienced \"skin crawling\" and anger outbursts at her family which are out of character for her. Pt states she takes klonopin and propranolol as needed which are only minimally helpful with her anxiety. Pt denies SI but states \"I had a thought that it would be peaceful for it all to stop\". Pt shared these thoughts with her  and decided to get help. Pt has an 11 year old daughter at home who is a protective factor for her. She is interested in discussing medication changes today. Pt denies HI/AH/VH. Denies drug or alcohol use.     Pt states she Nursing and risk assessments completed. Assessments reviewed with LMHP and physician. Admission information reviewed with patient. Patient given a tour of [facility] and instructions on using the facility. Questions regarding [facility] addressed. Pt safety search completed.     "

## 2023-11-10 NOTE — PROGRESS NOTES
[facility] Progress Note    Client Name: Antonietta Roy  Date: November 10, 2023  Service Type:  Group Therapy          Response:  Patient did not participate in group as she was meeting with Dr Anand at that time     SARAH Germain
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
## 2023-11-12 ENCOUNTER — HOSPITAL ENCOUNTER (OUTPATIENT)
Facility: CLINIC | Age: 33
Setting detail: OBSERVATION
Discharge: HOME | End: 2023-11-14
Attending: EMERGENCY MEDICINE | Admitting: NURSE PRACTITIONER
Payer: COMMERCIAL

## 2023-11-12 DIAGNOSIS — F41.0 GENERALIZED ANXIETY DISORDER WITH PANIC ATTACKS: ICD-10-CM

## 2023-11-12 DIAGNOSIS — F41.1 GENERALIZED ANXIETY DISORDER WITH PANIC ATTACKS: ICD-10-CM

## 2023-11-12 PROBLEM — F41.9 ANXIETY: Status: ACTIVE | Noted: 2023-11-12

## 2023-11-12 PROCEDURE — 250N000013 HC RX MED GY IP 250 OP 250 PS 637: Performed by: NURSE PRACTITIONER

## 2023-11-12 PROCEDURE — 99285 EMERGENCY DEPT VISIT HI MDM: CPT

## 2023-11-12 PROCEDURE — G0378 HOSPITAL OBSERVATION PER HR: HCPCS

## 2023-11-12 RX ORDER — ONDANSETRON 4 MG/1
4 TABLET, ORALLY DISINTEGRATING ORAL EVERY 8 HOURS PRN
Status: DISCONTINUED | OUTPATIENT
Start: 2023-11-12 | End: 2023-11-14 | Stop reason: HOSPADM

## 2023-11-12 RX ORDER — QUETIAPINE FUMARATE 50 MG/1
50 TABLET, FILM COATED ORAL 3 TIMES DAILY PRN
Status: DISCONTINUED | OUTPATIENT
Start: 2023-11-12 | End: 2023-11-12

## 2023-11-12 RX ORDER — PROPRANOLOL HYDROCHLORIDE 10 MG/1
10 TABLET ORAL 2 TIMES DAILY
Status: DISCONTINUED | OUTPATIENT
Start: 2023-11-12 | End: 2023-11-12

## 2023-11-12 RX ORDER — LORAZEPAM 1 MG/1
1 TABLET ORAL DAILY PRN
Status: DISCONTINUED | OUTPATIENT
Start: 2023-11-13 | End: 2023-11-14 | Stop reason: HOSPADM

## 2023-11-12 RX ORDER — QUETIAPINE FUMARATE 25 MG/1
25 TABLET, FILM COATED ORAL 3 TIMES DAILY PRN
Status: DISCONTINUED | OUTPATIENT
Start: 2023-11-12 | End: 2023-11-14 | Stop reason: HOSPADM

## 2023-11-12 RX ORDER — TRAZODONE HYDROCHLORIDE 50 MG/1
50 TABLET, FILM COATED ORAL
Status: DISCONTINUED | OUTPATIENT
Start: 2023-11-12 | End: 2023-11-14 | Stop reason: HOSPADM

## 2023-11-12 RX ADMIN — QUETIAPINE FUMARATE 25 MG: 25 TABLET ORAL at 22:05

## 2023-11-12 ASSESSMENT — ACTIVITIES OF DAILY LIVING (ADL)
ADLS_ACUITY_SCORE: 35
ADLS_ACUITY_SCORE: 35

## 2023-11-13 PROBLEM — F32.9 MAJOR DEPRESSIVE DISORDER, SINGLE EPISODE, UNSPECIFIED: Status: ACTIVE | Noted: 2023-11-13

## 2023-11-13 PROCEDURE — 250N000013 HC RX MED GY IP 250 OP 250 PS 637: Performed by: NURSE PRACTITIONER

## 2023-11-13 PROCEDURE — G0378 HOSPITAL OBSERVATION PER HR: HCPCS

## 2023-11-13 PROCEDURE — 99222 1ST HOSP IP/OBS MODERATE 55: CPT | Performed by: PSYCHIATRY & NEUROLOGY

## 2023-11-13 PROCEDURE — 250N000013 HC RX MED GY IP 250 OP 250 PS 637: Performed by: PSYCHIATRY & NEUROLOGY

## 2023-11-13 RX ORDER — PROPRANOLOL HYDROCHLORIDE 10 MG/1
10 TABLET ORAL 3 TIMES DAILY
Status: DISCONTINUED | OUTPATIENT
Start: 2023-11-13 | End: 2023-11-14 | Stop reason: HOSPADM

## 2023-11-13 RX ORDER — PAROXETINE 10 MG/1
10 TABLET, FILM COATED ORAL DAILY
Status: DISCONTINUED | OUTPATIENT
Start: 2023-11-13 | End: 2023-11-14 | Stop reason: HOSPADM

## 2023-11-13 RX ADMIN — QUETIAPINE FUMARATE 25 MG: 25 TABLET ORAL at 17:08

## 2023-11-13 RX ADMIN — QUETIAPINE FUMARATE 25 MG: 25 TABLET ORAL at 07:52

## 2023-11-13 RX ADMIN — PROPRANOLOL HYDROCHLORIDE 10 MG: 10 TABLET ORAL at 16:05

## 2023-11-13 RX ADMIN — PROPRANOLOL HYDROCHLORIDE 10 MG: 10 TABLET ORAL at 21:14

## 2023-11-13 RX ADMIN — PAROXETINE HYDROCHLORIDE 10 MG: 10 TABLET, FILM COATED ORAL at 10:47

## 2023-11-13 RX ADMIN — PROPRANOLOL HYDROCHLORIDE 10 MG: 10 TABLET ORAL at 10:47

## 2023-11-13 ASSESSMENT — ACTIVITIES OF DAILY LIVING (ADL)
ADLS_ACUITY_SCORE: 35

## 2023-11-13 NOTE — PLAN OF CARE
Antonietta Stone  November 13, 2023  Plan of Care Hand-off Note     Patient Care Path: observation    Plan for Care:   Patient presented to the ED tonight after reports of similar symptoms she presented with on Friday. Patient reports she has continued to experience panic, anxiousness, racing heart, racing thoughts, overwhelm, etc. Patient reports she has continued to experience difficulties with managing these symptoms. Patient shared the medication she was prescribed was effective, though short lived. Patient reports she is open to discussing medication with provider as well as options for higher levels of care treatment for her mental health if that is needed. She reports not knowing whether outpatient therapy will be enough support for her. Patient denies suicide ideation, homicide ideation, substance use, self-harm, or auditory or visual hallucinations. Given patient's presenting symptoms and goals for being on [facility], patient will remain on observation on [facility] and meet with psychiatrist in AM as well as LMHP to begin planning next steps.    Identified Goals and Safety Issues: Patient to meet with provider to discuss medication and LMHP to discuss treatment options and better understand her anxiety.    Legal Status: Legal Status at Admission: (MARIAM.)    Psychiatry Consult: Patient to see psychiatrist on [facility].    Updated RN regarding plan of care.      Christy Frey, KOFI, LGSW, Psychotherapist Trainee

## 2023-11-13 NOTE — PROGRESS NOTES
"33 year old female with history of anxiety and depression received from ED due to anxiety and panic attacks. Pt was seen on [facility] on 11/10 and discharged home as she needed to help out with her 11 year old daughter. During that visit, Buspar was stopped and she was started on scheduled propranolol BID and PRN Ativan. She has been taking medications as prescribed but continues to experience intense anxiety, heightened emotional responses, and insomnia. Pt states the Ativan is helpful for about 4 hours but then symptoms return at \"12/10 severity\". Pt returned to [facility] for additional support. Pt denies SI but states that when she experiences intense anxiety she \"thinks it would be easier for everything to just stop\".     Pt tearful during intake. She is polite and cooperative with safety search. BP elevated 138/103, , O2 98%.       Nursing and risk assessments completed. Assessments reviewed with LMHP and physician. Admission information reviewed with patient. Patient given a tour of [facility] and instructions on using the facility. Questions regarding [facility] addressed. Pt safety search completed.         "

## 2023-11-13 NOTE — ED NOTES
[facility]  ED to [facility] Checklist:      Goal for [facility]: Anxiety management    Current Behavior: Anxious    Safety Concerns: None    Legal Hold Status: Voluntary    Medically Cleared by ED provider: Yes    Patient Therapeutically Searched: Therapeutic search by ED staff (strings, belts, shoes, pockets, electronics, etc.)    Belongings: Remain with patient    Independent Ambulation at Baseline: Yes/No: Yes    Participates in Care/Conversation: Yes/No: Yes    Patient Informed about [facility]: Yes/No: Yes    DEC:  Not ordered    Patient Ready to be Transferred to [facility]? Yes/No: Yes

## 2023-11-13 NOTE — ED NOTES
Pt appears to have slept/rested during the noc shift in her assigned recliner, respirations even and unlabored. Pt up briefly around 0300 to meet with LMHP in the conference room and back to sleep without incident.

## 2023-11-13 NOTE — PROGRESS NOTES
Vitals reassessed /90 HR 63 O2 98%    Pt reports anxiety is moderate at this time. Continues to be tearful when talking with staff. Given seroquel 25 mg. Pt hopes to get a good night's rest tonight. She is aware that she has medications available if she needs and states she will reach out to RN if she needs additional support tonight.     Plan for patient to be assessed by psychiatry in the morning. She is agreeable to plan.

## 2023-11-13 NOTE — ED NOTES
Pt. woke with high anxiety this AM. Reports that sleep was disrupted around 0300 for HP interview and had a difficult time achieving sleep after the interview. Reports feeling anxious and exhausted this AM. Given Seroquel 25 mg. Eating breakfast. VSS.

## 2023-11-13 NOTE — ED PROVIDER NOTES
"  History     Chief Complaint:  Anxiety and Psychiatric Evaluation       HPI   Antonietta Stone is a 33 year old female who presents back to the ED after her discharge on Friday due to continued anxiety. Antonietta reports that the Ativan and Propanolol give her anxiety relief for about 4 hours at a time, however, she is seeking another option as she can only take these medications once per day. She adds that she was helping her  plan a trip out of the country this weekend, and had the thought of inducing self-harm upon herself while he was gone. She denies any alcohol use.     Independent Historian:   None - Patient Only    Review of External Notes:   NA     Medications:    Lorazepam  Propanolol    Past Medical History:    Anxiety  Depression    Past Surgical History:    TM tube placement      Physical Exam   Patient Vitals for the past 24 hrs:   BP Temp Temp src Pulse Resp SpO2 Height Weight   11/12/23 1937 (!) 145/107 (!) 96.5  F (35.8  C) Temporal 83 16 100 % 1.727 m (5' 8\") 90.7 kg (200 lb)        Physical Exam  General: No acute distress. Tearful.   Head: No obvious trauma to head.  Eyes:  Conjunctivae clear.   Neck: Normal range of motion.   CV: Skin is well perfused, no cyanosis  Respiratory: Effort normal. No audible wheezing.  Gastrointestinal: Non-distended.  Musculoskeletal: Normal range of motion. No gross deformities.  Neuro: Alert. Moving all extremities appropriately.  Normal speech.    Skin: No rashes or lesions on exposed skin.  Psych: No active SI. No HI. Passive suicidal thoughts.     Emergency Department Course     Laboratory:  Labs Ordered and Resulted from Time of ED Arrival to Time of ED Departure - No data to display     Emergency Department Course & Assessments:    Assessments/Consultations/Discussion of Management or Tests:  ED Course as of 11/12/23 1957   Sun Nov 12, 2023 1957 I evaluated the patient.      Social Determinants of Health affecting care:   None    Disposition:  The " patient was transferred to [facility].     Impression & Plan      Medical Decision Making:  Patient arrives with continued anxiety.  She states she is not receiving adequate relief of her symptoms with the medication adjustments after her last [facility] stay, and she is hoping to return for further help.  No active suicidal ideation.  [facility] and [facility] safely.    Diagnosis:    ICD-10-CM    1. Anxiety  F41.9          Scribe Disclosure:  I, LIZBETH RICHMOND, am serving as a scribe at 7:44 PM on 11/12/2023 to document services personally performed by Shawn Carney MD based on my observations and the provider's statements to me.     11/12/2023   Shawn Carney MD Peery, Stephen, MD  11/12/23 2126

## 2023-11-13 NOTE — CONSULTS
"Diagnostic Evaluation Consultation  Crisis Assessment    Patient Name: Antonietta Stone  Age:  33 year old  Legal Sex: female  Gender Identity: female  Pronouns:   Race: White  Ethnicity: Not  or   Language: English      Patient was assessed: In person      Patient location: [facility] EMERGENCY DEPT                             [id number]    Referral Data and Chief Complaint  Antonietta Stone presents to the ED by self. Patient is presenting to the ED for the following concerns: Anxiety. Factors that make the mental health crisis life threatening or complex are:  Patient has been experiencing increased anxiety and feelings of panic for the last week, and has struggled with anxiety generally for about 1.5 years. Patient reports medication interventions are effective for short lived periods of time and she is struggling to manage her thoughts and anxiety when the effects of the medications wear off. Patient no longer has an outpatient care team established as she reports \"they abandoned me when I tried to talk to them about what I was experiencing.\" Patient was set up with a new outpatient psychiatrist appointment on Friday 11/17, though feels she cannot wait until then for more help..    Informed Consent and Assessment Methods  Explained the crisis assessment process, including applicable information disclosures and limits to confidentiality, assessed understanding of the process, and obtained consent to proceed with the assessment.  Assessment methods included conducting a formal interview with patient, review of medical records, collaboration with medical staff, and obtaining relevant collateral information from family and community providers when available. Done.    Patient response to interventions: acceptance expressed, verbalizes understanding  Coping skills were attempted to reduce the crisis:  Patient is open to discussing medications and potential higher levels of care. Patient is " "hoping to get some rest tonight before meeting with provider in AM.     History of the Crisis   Patient reports she has continued to endorse the same symptoms as when she presented to [facility] a few days ago. She reports her anxiety and panic symptoms have continued to be unmanageable throughout her day. She reports it impairs her ability to engage in day to day tasks, sharing she could not play a video game with her child because of the intense anxiety she felt in that moment. Patient reports she attempted to take the medication that was prescribed for her, though she only felt relief for a few hours and was not able to manage the anxiety and thoughts once the medication effects reduced. Patient reports she was planning a trip with her  and had the thought of, \"if I wanted to end my life, that would be the time to do it.\" Patient states she also has thoughts of, \"I can't fo this forever.\" Patient shared she does not consider these thoughts to be suicide ideation as she does not want to have these thoughts or engage in them. She reports she does not feel safe in her head at this time. Patient reports she did not feel she had anywhere else to go, though also states she felt validated in her experience when she last presented to [facility].    Brief Psychosocial History  Family:  , Children yes  Support System:    Employment Status:  employed full-time  Source of Income:  salary/wages  Financial Environmental Concerns:  none  Current Hobbies:  family functions (Time with family.)  Barriers in Personal Life:  mental health concerns    Significant Clinical History  Current Anxiety Symptoms:  panic attack, racing thoughts, shortness of breath or racing heart, anxious  Current Depression/Trauma:  difficulty concentrating, crying or feels like crying, helplessness, sadness, thoughts of death/suicide (Patient reports she would not consider her thoughts to be suicide ideation as she does not want them and does " "not want to engage with them.)  Current Somatic Symptoms:  racing thoughts, shortness of breath or racing heart, anxious  Current Psychosis/Thought Disturbance:   (N/A.)  Current Eating Symptoms:  loss of appetite (Patient reports she has not eaten much as she is constantly nauseous and struggling with the physical manifestations of her anxiety.)  Chemical Use History:  Alcohol: None  Benzodiazepines: None  Opiates: None  Cocaine: None  Marijuana: None  Other Use: None  Withdrawal Symptoms:  (N/A.)  Addictions:  (N/A.)   Past diagnosis:  Anxiety Disorder, Depression, PTSD  Family history:  Substance Use Disorder (Patient reports her parents and her siblings struggle with substance use.)  Past treatment:  Individual therapy, Psychiatric Medication Management, Primary Care  Details of most recent treatment:  Patient reports she was seeing an outpatient therapist and psychiatrist, though they \"abandoned\" her when she began to express her increased symptoms. Patient does have an outpatient psychiatry appointment set up for Friday 11/17.  Other relevant history:  N/A.     Collateral Information  Is there collateral information: Yes     Collateral information name, relationship, phone number:  Rafaela Stone (spouse) [phone]    What happened today: She has been experiencing that same symptoms. I thought she was feeling a little better and she ate a little more, though she is still struggling. She is the same as when she discharged from you on Friday. She worries herself, her brain is working all the time. She's just scared she's going to be stuck like this forever.     What is different about patient's functioning: She seems to be able to take care for herself but the anxiety can be hard.     Concern about alcohol/drug use: No.    What do you think the patient needs: I think she needs someone to talk to and more help.    Has patient made comments about wanting to kill themselves/others: no    If d/c is recommended, " "can they take part in safety/aftercare planning: yes    Additional collateral information:  N/A.     Risk Assessment  [place] Suicide Severity Rating Scale Full Clinical Version:  Suicidal Ideation  Q6 Suicide Behavior (Lifetime): no     [place] Suicide Severity Rating Scale Recent:   Suicidal Ideation (Recent)  Q1 Wished to be Dead (Past Month): no (Patient reports she does not want to have these thoughts. Patient reports she was planning a weekend trip with her spouse when she had a thought of, \"if I wanted to, that would be the time.\")  Q2 Suicidal Thoughts (Past Month): no  Q3 Suicidal Thought Method: no  Q4 Suicidal Intent without Specific Plan: no  Q5 Suicide Intent with Specific Plan: no  Level of Risk per Screen: low risk  Intensity of Ideation (Recent)  Most Severe Ideation Rating (Past 1 Month): 1  Frequency (Past 1 Month): 2-5 times in week  Duration (Past 1 Month): Fleeting, few seconds or minutes  Controllability (Past 1 Month): Can control thoughts with little difficulty  Deterrents (Past 1 Month): Does not apply  Reasons for Ideation (Past 1 Month): Does not apply  Suicidal Behavior (Recent)  Actual Attempt (Past 3 Months): No  Has subject engaged in non-suicidal self-injurious behavior? (Past 3 Months): No  Interrupted Attempts (Past 3 Months): No  Aborted or Self-Interrupted Attempt (Past 3 Months): No  Preparatory Acts or Behavior (Past 3 Months): No    Environmental or Psychosocial Events: other life stressors, helplessness/hopelessness  Protective Factors: Protective Factors: strong bond to family unit, community support, or employment, intact marriage or domestic partnership, responsibilities and duties to others, including pets and children, lives in a responsibly safe and stable environment, good treatment engagement, sense of importance of health and wellness, help seeking, optimistic outlook - identification of future goals    Does the patient have thoughts of harming others? Feels Like " Hurting Others: no  Previous Attempt to Hurt Others: no  Current presentation:  (N/A.)  Is the patient engaging in sexually inappropriate behavior?: no    Is the patient engaging in sexually inappropriate behavior?  no        Mental Status Exam   Affect: Appropriate  Appearance: Appropriate  Attention Span/Concentration: Attentive  Eye Contact: Engaged    Fund of Knowledge: Appropriate   Language /Speech Content: Fluent  Language /Speech Volume: Normal  Language /Speech Rate/Productions: Normal  Recent Memory: Intact  Remote Memory: Intact  Mood: Sad, Anxious  Orientation to Person: Yes   Orientation to Place: Yes  Orientation to Time of Day: Yes  Orientation to Date: Yes     Situation (Do they understand why they are here?): Yes  Psychomotor Behavior: Normal  Thought Content: Clear  Thought Form: Intact, Goal Directed     Mini-Cog Assessment  N/A.     Medication  Psychotropic medications:   Medication Orders - Psychiatric (From admission, onward)      Start     Dose/Rate Route Frequency Ordered Stop    11/13/23 0000  LORazepam (ATIVAN) tablet 1 mg         1 mg Oral DAILY PRN 11/12/23 2128 11/12/23 2155  QUEtiapine (SEROquel) tablet 25 mg         25 mg Oral 3 TIMES DAILY PRN 11/12/23 2155 11/12/23 2132  traZODone (DESYREL) tablet 50 mg         50 mg Oral AT BEDTIME PRN 11/12/23 2132       Current Care Team  Patient Care Team:  Chalo Vo MD as PCP - General (Family Practice)  Chalo Vo MD as Assigned PCP    Diagnosis  Patient Active Problem List   Diagnosis Code    Contraception Z78.9    CARDIOVASCULAR SCREENING; LDL GOAL LESS THAN 160 Z13.6    Immunization not carried out because of patient decision Z28.20    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 Patient reports she has continued to experience panic, anxiousness, racing heart, racing thoughts, overwhelm, etc. Patient reports she has continued to experience difficulties with managing these symptoms. Patient shared the medication she was prescribed was effective, though short lived. Patient reports she is open to discussing medication with provider as well as options for higher levels of care treatment for her mental health if that is needed. She reports not knowing whether outpatient therapy will be enough support for her. Patient denies suicide ideation, homicide ideation, substance use, self-harm, or auditory or visual hallucinations. Given patient's presenting symptoms and goals for being on [facility], patient will remain on observation on [facility] and meet with psychiatrist in AM as well as [facility] to begin planning next steps.    Patient coping skills attempted to reduce the crisis: Patient is open to discussing medications and potential higher levels of care. Patient is hoping to get some rest tonight before meeting with provider in AM.    Disposition  Recommended disposition: Observation        Reviewed case and recommendations with attending provider. Attending Name: Consult in progress at the time of this assessment.       Attending concurs with disposition: (Consult in progress at the time of this assessment.)       Patient and/or validated legal guardian concurs with disposition: yes      Final disposition:  observation    Legal status on admission:  (MARIAM.)    Assessment Details   Total duration spent with the patient: 30 min     CPT code(s) utilized: 31625 - Psychotherapy for Crisis - 60 (30-74*) min    Christy Frey, KOFI, JEREMIE, Psychotherapist Trainee  DEC - Triage &  Transition Services  Callback: [phone]

## 2023-11-13 NOTE — ED NOTES
Pt. reports that she feels slightly dizzy -similar to what she has been feeling the last several days. Feels discouraged that she has been unable to get relief of anxiety with typically comforting skills/strategies. Is feeling some relief from Seroquel-little relief from Propranolol. Has noticed that appetite has returned this afternoon-ate well for lunch.

## 2023-11-13 NOTE — PROGRESS NOTES
"[facility] Progress Note    Client Name: Antonietta Stone  Date: November 13, 2023  Service Type:  Group Therapy  Session Start Time:  1215  Session End Time: 1240  Session Length: 25  Attendees: Patient and other group members  Facilitator: KOFI Falk LICSW     Topic:   Identify at least two goals for the day, share a positive and challenging experience from the past 24 hours, and participated in the Three Animal activity    Intervention:    Group process: support, challenge, affirm, psycho-education.     Response:  Patient did participate in group. Behavior in group was engaged, pleasant, and supported. Patient shared her goals for the day to be \"trust the process\" and get some rest.  Her challenging experience in the past 24 hours is having a lot of anxiety, feeling out of control, and like she is losing her mind.  Patient's positive experience is being able to eat food again.       The Three Animals activity has patients identify their three favorite animals and what qualities they like about those animals.  These qualities of those animals are meant to represent how patient wants others to see them, how patient actually sees themself, and who the patient really is.  Patient's animals were roberto, dolphin, and elephant.      KOFI Falk LICSW        "

## 2023-11-13 NOTE — ED PROVIDER NOTES
[facility] Unit - Initial Psychiatric Observation Note  [facility] Emergency Department  Observation Initiation Date: Nov 12, 2023    Antonietta Stone MRN: [id number]   Age: 33 year old YOB: 1990     History     Chief Complaint   Patient presents with    Anxiety    Psychiatric Evaluation     HPI  Antonietta Stone is a 33 year old female with a past history notable for a generalized anxiety disorder and panic attacks who is familiar to me from a recent visit to the [facility] unit.  After returning home on Friday, the patient notes ongoing mood and anxiety symptoms which have led to her return to the emergency department.  She was not able to tolerate the intensity of symptoms which further led to a state of helplessness, hopelessness, and thoughts of death leading her to return to the emergency department for help.  She is now approaching 15 hours in the emergency department.  On reassessment today, the patient notes that since discontinuing BuSpar, symptoms of anxiety and panic attacks are no longer constant however continue to occur in waves throughout the day.  These waves continue to reach and intensity that is poorly tolerated and does not respond adequately to typical coping strategies or environmental and behavioral distractions.  In response to moments of panic attacks, her mood becomes depressed and she begins to welcome the idea of death as a means of gaining relief from the discomfort.  She denied active suicidal thoughts.  There is no indication of psychosis or homicidal thoughts.  She notes no new psychosocial stressors although interprets her home situation to be stressful and triggering.  She notes some benefit from propranolol however questions whether she is experiencing dizziness or lightheadedness from the medicine or from panic attacks.  She inquired if it is possible for her to lose consciousness during a panic attack.  Ativan has been more favorable than clonazepam, noting that she gets  "about 4 hours of relief, although disappointed that the relief does not last longer.  She presents today as help seeking, desiring additional coping strategies and medication adjustments to address her ongoing state of anxiety and panic.    Past Medical History  No past medical history on file.  No past surgical history on file.  LORazepam (ATIVAN) 1 MG tablet  ondansetron (ZOFRAN ODT) 4 MG ODT tab  propranolol (INDERAL) 10 MG tablet      Allergies   Allergen Reactions    P[place]apple GI Disturbance     Family History  Family History   Problem Relation Age of Onset    Substance Abuse Mother     Substance Abuse Father     Substance Abuse Brother     Heart Disease Brother      Social History   Social History     Tobacco Use    Smoking status: Never    Smokeless tobacco: Never   Vaping Use    Vaping Use: Never used   Substance Use Topics    Alcohol use: No    Drug use: No          Review of Systems  A medically appropriate review of systems was performed with pertinent positives and negatives noted in the HPI, and all other systems negative.    Physical Examination   BP: (!) 145/107  Pulse: 83  Temp: (!) 96.5  F (35.8  C)  Resp: 16  Height: 172.7 cm (5' 8\")  Weight: 90.7 kg (200 lb)  SpO2: 100 %    Physical Exam  General: Appears stated age.   Neuro: Alert and fully oriented. Extremities appear to demonstrate normal strength on visual inspection.   Integumentary/Skin: no rash visualized, normal color    Psychiatric Examination   Appearance: awake, alert  Attitude:  cooperative  Eye Contact:  fair  Mood:  anxious and sad   Affect:  mood congruent  Speech:  clear, coherent  Psychomotor Behavior:  no evidence of tardive dyskinesia, dystonia, or tics  Thought Process:  logical and linear  Associations:  no loose associations  Thought Content:  no evidence of suicidal ideation or homicidal ideation and no evidence of psychotic thought  Insight:  fair  Judgement:  fair  Oriented to:  time, person, and place  Attention Span and " Concentration:  fair  Recent and Remote Memory:  fair  Language: able to name/identify objects without impairment  Fund of Knowledge: intact with awareness of current and past events    ED Course     ED Course as of 11/13/23 1008   Sun Nov 12, 2023 1957 I evaluated the patient.        Labs Ordered and Resulted from Time of ED Arrival to Time of ED Departure - No data to display    Assessments & Plan (with Medical Decision Making)   Patient presenting with worsening anxiety and panic leading to a dysphoric reaction which is often accompanied with a brief moment of hopelessness and thoughts of death.  Her treatment plan is focused on optimizing antianxiety interventions while emphasizing the time needed to anticipate meaningful improvement. Nursing notes reviewed noting no acute issues.     I have reviewed the assessment completed by the [facility].     During the observation period, the patient did not require medications for agitation, and did not require restraints/seclusion for patient and/or provider safety.     The patient was found to have a psychiatric condition that would benefit from an observation stay in the emergency department for further psychiatric stabilization and/or coordination of a safe disposition. The observation plan includes serial assessments of psychiatric condition, potential administration of medications if indicated, further disposition pending the patient's psychiatric course during the monitoring period.     Preliminary diagnosis:    ICD-10-CM    1. Generalized anxiety disorder with panic attacks  F41.1     F41.0            Treatment Plan:  -Begin Paxil 10 mg daily for treatment of ROSALVA and panic attacks.  Risks and benefits were reviewed.  If tolerated well, consider increasing to 20 mg daily.  -Increase propranolol from 10 mg twice a day to 10 mg 3 times a day to optimize antianxiety interventions.  Vital signs were reviewed and should tolerate the higher dose of this  medication.  -Seroquel 25 mg as needed may be utilized as an alternative to Ativan to help abort acute anxiety and panic.  -Continue Ativan 1 mg daily as needed for reduction of anxiety and panic  -The patient was educated on the time needed to achieve a meaningful response to her treatment plan  -Upcoming appointments for individual psychotherapy and outpatient medication management  -Recommend pursuing GeneSight testing to rule out whether metabolism abnormalities have complicated her response to medications thus far  -Enter to observation status and reassess tomorrow.    --  Justin Pichardo MD   [facility] EMERGENCY DEPT  [facility] Unit       Justin Pichardo MD  11/13/23 1987

## 2023-11-13 NOTE — ED TRIAGE NOTES
Patient was seen in ED 2 days ago and decided to leave without getting helped since 11 year old daughter at home needed her. Tried to manage it at home with new medication Propanolol but failed. States propanolol helps with anxiety but make her more dizzy. Willing to go back to [facility] again and get more help.      Triage Assessment (Adult)       Row Name 11/12/23 1938          Triage Assessment    Airway WDL WDL        Skin Circulation/Temperature WDL    Skin Circulation/Temperature WDL WDL        Cardiac WDL    Cardiac WDL X        Peripheral/Neurovascular WDL    Peripheral Neurovascular WDL WDL        Cognitive/Neuro/Behavioral WDL    Cognitive/Neuro/Behavioral WDL X

## 2023-11-14 VITALS
RESPIRATION RATE: 18 BRPM | HEIGHT: 68 IN | TEMPERATURE: 97.4 F | DIASTOLIC BLOOD PRESSURE: 86 MMHG | HEART RATE: 73 BPM | SYSTOLIC BLOOD PRESSURE: 118 MMHG | WEIGHT: 200 LBS | BODY MASS INDEX: 30.31 KG/M2 | OXYGEN SATURATION: 98 %

## 2023-11-14 PROCEDURE — 99239 HOSP IP/OBS DSCHRG MGMT >30: CPT | Performed by: PSYCHIATRY & NEUROLOGY

## 2023-11-14 PROCEDURE — 250N000013 HC RX MED GY IP 250 OP 250 PS 637: Performed by: NURSE PRACTITIONER

## 2023-11-14 PROCEDURE — G0378 HOSPITAL OBSERVATION PER HR: HCPCS

## 2023-11-14 PROCEDURE — 250N000013 HC RX MED GY IP 250 OP 250 PS 637: Performed by: PSYCHIATRY & NEUROLOGY

## 2023-11-14 RX ORDER — QUETIAPINE FUMARATE 25 MG/1
25 TABLET, FILM COATED ORAL 3 TIMES DAILY PRN
Qty: 30 TABLET | Refills: 0 | Status: SHIPPED | OUTPATIENT
Start: 2023-11-14

## 2023-11-14 RX ORDER — PAROXETINE 20 MG/1
20 TABLET, FILM COATED ORAL EVERY MORNING
Qty: 30 TABLET | Refills: 0 | Status: SHIPPED | OUTPATIENT
Start: 2023-11-14

## 2023-11-14 RX ORDER — PROPRANOLOL HYDROCHLORIDE 10 MG/1
10 TABLET ORAL 3 TIMES DAILY
COMMUNITY
Start: 2023-11-14

## 2023-11-14 RX ADMIN — PROPRANOLOL HYDROCHLORIDE 10 MG: 10 TABLET ORAL at 07:33

## 2023-11-14 RX ADMIN — PROPRANOLOL HYDROCHLORIDE 10 MG: 10 TABLET ORAL at 14:16

## 2023-11-14 RX ADMIN — QUETIAPINE FUMARATE 25 MG: 25 TABLET ORAL at 07:33

## 2023-11-14 RX ADMIN — PAROXETINE HYDROCHLORIDE 10 MG: 10 TABLET, FILM COATED ORAL at 07:33

## 2023-11-14 ASSESSMENT — ACTIVITIES OF DAILY LIVING (ADL)
ADLS_ACUITY_SCORE: 35

## 2023-11-14 ASSESSMENT — COLUMBIA-SUICIDE SEVERITY RATING SCALE - C-SSRS
SUICIDE, SINCE LAST CONTACT: NO
ATTEMPT SINCE LAST CONTACT: NO
6. HAVE YOU EVER DONE ANYTHING, STARTED TO DO ANYTHING, OR PREPARED TO DO ANYTHING TO END YOUR LIFE?: NO
1. SINCE LAST CONTACT, HAVE YOU WISHED YOU WERE DEAD OR WISHED YOU COULD GO TO SLEEP AND NOT WAKE UP?: NO
TOTAL  NUMBER OF ABORTED OR SELF INTERRUPTED ATTEMPTS SINCE LAST CONTACT: NO
TOTAL  NUMBER OF INTERRUPTED ATTEMPTS SINCE LAST CONTACT: NO
2. HAVE YOU ACTUALLY HAD ANY THOUGHTS OF KILLING YOURSELF?: NO

## 2023-11-14 NOTE — ED PROVIDER NOTES
"EmPATH Unit - Psychiatric Observation Discharge Summary  [facility] Emergency Department  Discharge Date: 11/14/2023    Antonietta Stone MRN: [id number]   Age: 33 year old YOB: 1990     Brief HPI & Initial ED Course     Chief Complaint   Patient presents with    Anxiety    Psychiatric Evaluation     HPI  Antonietta Stone is a 33 year old female with history notable for a generalized anxiety disorder and panic attacks who is currently under observation status on the EmPATH unit, now approaching 41 hours in the emergency department.  Overnight, there were no acute issues.  On reassessment today, the patient notes waking this morning with a high intensity of anxiety however after taking her medications, she noted a significant reduction in the intensity.  She explains that this has been a consistent pattern during her stay which has helped reassure her that she is responding favorably to her medications.  This has lessened her sense of helplessness and given her a new sense of hope for recovery.  She is tolerating Paxil without side effects.  She slept very well last night.  Her appetite has improved and is now described as being normal after several days of not being able to eat well.  As a result, her episodes of lightheadedness and dizziness have resolved.  She described the intensity of her mood and anxiety symptoms today as being a 5 on a scale of 0-10 with 10 being most severe.  She denied suicidal and homicidal thoughts.  She denied psychotic symptoms.  Noting these improvements, she is looking forward to returning home today.          Physical Examination   BP: 118/86  Pulse: 73  Temp: 97.4  F (36.3  C)  Resp: 18  Height: 172.7 cm (5' 8\")  Weight: 90.7 kg (200 lb)  SpO2: 98 %  Sitting Orthostatic BP: 119/86  Standing Orthostatic BP: 122/88    Physical Exam  General: Appears stated age.   Neuro: Alert and fully oriented. Extremities appear to demonstrate normal strength on visual inspection. "   Integumentary/Skin: no rash visualized, normal color    Psychiatric Examination   Appearance: awake, alert  Attitude:  cooperative  Eye Contact:  fair  Mood:  anxious and better  Affect:  appropriate and in normal range  Speech:  clear, coherent  Psychomotor Behavior:  no evidence of tardive dyskinesia, dystonia, or tics  Thought Process:  logical and linear  Associations:  no loose associations  Thought Content:  no evidence of suicidal ideation or homicidal ideation and no evidence of psychotic thought  Insight:  fair  Judgement:  intact  Oriented to:  time, person, and place  Attention Span and Concentration:  intact  Recent and Remote Memory:  fair  Language: able to name/identify objects without impairment  Fund of Knowledge: intact with awareness of current and past events    Results     ED Course as of 11/14/23 1152   Sun Nov 12, 2023 1957 I evaluated the patient.        Labs Ordered and Resulted from Time of ED Arrival to Time of ED Departure - No data to display    Observation Course   The patient was found to have a psychiatric condition that would benefit from an observation stay in the emergency department for further psychiatric stabilization and/or coordination of a safe disposition. The plan upon observation admission included serial assessments of psychiatric condition, potential administration of medications if indicated, further disposition pending the patient's psychiatric course during the monitoring period.     Serial assessments of the patient's psychiatric condition were performed. Nursing notes were reviewed. During the observation period, the patient did not require medications for agitation, and did not require restraints/seclusion for patient and/or provider safety.     After a period of working with the treatment team on the EmPATH unit, the patient's mental state improved to allow a safe transition to outpatient care. After counseling on the diagnosis, work-up, and treatment plan,  the patient was discharged. Close follow-up with a psychiatrist and/or therapist was recommended and community psychiatric resources were provided. Patient is to return to the ED if any urgent or potentially life-threatening concerns.     Discharge Diagnoses:   Final diagnoses:   Generalized anxiety disorder with panic attacks       Treatment Plan:  -Increase Paxil from 10 mg to 20 mg daily to achieve for target dose as we aim to effectively treat symptoms of ROSALVA.  She has tolerated the lower dose without side effects.  Anticipate continued improvement in the upcoming weeks.  -Continue the higher dose of propranolol at 10 mg 3 times a day to optimize antianxiety interventions.  Vital signs remained stable and overall improved from the time of her arrival.  -Continue Seroquel 25 mg 3 times a day as needed as an alternative to Ativan to help abort acute anxiety and panic attacks.  She may continue to use Ativan 1 mg daily as needed for reduction of anxiety and panic.  -Referral for outpatient psychiatric medication management and psychotherapy  -Recommend pursuing GeneSight testing to rule out metabolism abnormalities having complicated her response to medications.  -Discharge home today.      At the time of discharge, the patient's acute suicide risk was determined to be low due to the following factors: Reduction in the intensity of mood/anxiety symptoms that preceded the admission, denial of suicidal thoughts, denies feeling helpless or helpless, not currently under the influence of alcohol or illicit substances, denies experiencing command hallucinations, no immediate access to firearms. The patient's acute risk could be higher if noncompliant with their treatment plan, medications, follow-up appointments or using illicit substances or alcohol. Protective factors include: social supports, children, stable housing    I spent more than 30 minutes on discharge day activities.    --  MD [facility]  [facility] EMERGENCY DEPT  EmPATH Unit       Justin Pichardo MD  11/14/23 1200

## 2023-11-14 NOTE — DISCHARGE INSTRUCTIONS
Additional Resources for Therapy:   [email or web address]    Psychology Today has resources for therapist that utilize narrative exposure therapy.     Trauma Focused Skills & Psychotherapy Group     [facility] Intensive Outpatient Program:   The Trauma Focused Skills & Psychotherapy Group focuses on supporting trauma survivors in increasing their awareness about trauma-related symptoms, understand how these symptoms impact their life and relationships, and learn skills to increase their ability to regulate their nervous systems and be more present in their bodies.    Our curriculum is based on Acceptance and Commitment Therapy (ACT), Dialectical Behavior Therapy (DBT), neurobiology of trauma theory, and Polyvagal Theory.    Group Programming  Monday-Thursday from 1:30 p.m.-3:30 p.m. for a six-week period  Each day consists of a one-hour process-oriented psychotherapy group and one hour of a psychoeducation/skills group    Please call [phone].   They are located at [address] [place], [place] [id number]    Type: Medication Mgmt - Initial (In-Person)  Date: Friday, 11/17/2023  Time: 9:00 am - 10:00 am  Provider: Ty SHERIDAN  CNP,RN  Location: [facility], [address] [place] [address], [address], [place] [address]  Phone: [phone]  Patient Instructions: Please fill New Patient Form by going to [email or web address]. All forms need to be completed 24hours prior to the appointment date/time  Please call us on [phone] 24 hours prior to your scheduled appointment to confirm that you are able to attend.    Type: Therapy - Initial (In-Person)  Date: Tuesday, 11/21/2023  Time: 9:00 am - 10:00 am  Provider: Muriel Lorenzo  Location: [facility], [address] [facility], [address], [place] [address]  Phone: [phone]  There are no patient instructions in our system so please call them to confirm appointment and get any information necessary.       Aftercare  "Plan  If I am feeling unsafe or I am in a crisis, I will:   Contact my established care providers   Call the National Suicide Prevention Lifeline: 988  Go to the nearest emergency room   Call 911     Warning signs that I or other people might notice when a crisis is developing for me: feeling \"edgy\", complaining of feeling \"overwhelmed', decreased appetite, feeling stressed out, and mood fluctuations    Things I am able to do on my own to cope or help me feel better: take a step back, be okay with doing less, learning that saying \"no\" is okay, knowing my limits, focus on what I can control, make time (scheduled) to rest and reset, ask for help     Things that I am able to do with others to cope or help me better: ask for help, delegate, communicate my feelings, saying now when I need to      Things I can use or do for distraction: be creative (cooking, art, dancing), reset and zone on TV, get out of the house     Changes I can make to support my mental health and wellness: recognize the signs I need to step back and slow down, advocate for myself in challenging environments, know my power to say no, leave, and pause; therapy, support groups, take/use meds for support, check in with myself weekly, incorporate supports into lifestyle     People in my life that I can ask for help: my      Your [facility] has a mental health crisis team you can call 24/7: [facility] Crisis  [phone]    Other things that are important when I'm in crisis: validation (my experience is real, it is scary), reassurance (it is scary but not forever, I am not alone); patience (for the ebbs and flows, somedays will be better than others); information (what is happening? Why is this happening? What can we do?); innovation (thinking outside of the box, perhaps it is not just \"the meds\" but how your body uses them.)     Crisis Lines  Crisis Text Line  Text 026808  You will be connected with a trained live crisis counselor to " "provide support.  Por espanol, texto  MACO a 072859 o texto a 442-AYUDAME en WhatsApp  [employer] (LGBTQ Youth Crisis Line)  9.112.911.3442  text START to 591-626    Community Resources  Fast Tracker  Linking people to mental health and substance use disorder resources  [employer].CÃ[employer]R   [place] Mental Health Warm Line  Peer to peer support  Monday thru Saturday, 12 pm to 10 pm  398.758.5208 or 8.424.289.3104  Text \"Support\" to 45266  National [employer] on Mental Illness (NANNETTE)  736.861.8133 or 1.888.NANNETTE.HELPS    Mental Health Apps  My3  https://[employer].org/  VirtualHopeBox  https://[employer]/apps/virtual-hope-box/    Additional Information  Today you were seen by a licensed mental health professional through Triage and Transition services, [facility] Providers ([facility])  for a crisis assessment in the Emergency Department at [facility].  It is recommended that you follow up with your established providers (psychiatrist, mental health therapist, and/or primary care doctor - as relevant) as soon as possible. Coordinators from [facility] will be calling you in the next 24-48 hours to ensure that you have the resources you need.  You can also contact [facility] coordinators directly at [phone]. You may have been scheduled for or offered an appointment with a mental health provider. [facility] maintains an extensive network of licensed behavioral health providers to connect patients with the services they need.  We do not charge providers a fee to participate in our referral network.  We match patients with providers based on a patient's specific needs, insurance coverage, and location.  Our first effort will be to refer you to a provider within your care system, and will utilize providers outside your care system as needed.    "

## 2023-11-14 NOTE — ED NOTES
Sleep improved last night but awake very anxious again this AM. Vitals stable. Requested and received scheduled Inderal and prn Seroquel. Currently eating breakfast.

## 2023-11-14 NOTE — ED NOTES
Pt. planning to discharge later today. Hoping to have therapy and eventually IOP for support/follow-up. Acknowledges improvement. Has support from  and is on an JESUS from work so feels hopeful that she can take some time to stabilize her Mental Health.

## 2023-11-14 NOTE — ED NOTES
Seen by psychiatry and LMHP. Determined stable to discharge. Discharge instructions reviewed with patient including follow-up care plan. Educated on medication regime and advised not to stop prescribed medication without consulting their physician. Reviewed safety plan and outpatient resources/appointments.Verbalized understanding of discharge instructions. Denies SI. All belongings which where brought into the hospital have been returned to patient. Escorted off the unit @ 1500. Discharged to  home.

## 2023-11-14 NOTE — PROGRESS NOTES
"EmPATH Group Progress Note    Client Name: Antonietta Stone  Date: November 14, 2023  Service Type:  Group Therapy  Session Start Time:  1030  Session End Time: 1050  Session Length: 20  Attendees: Patient and other group members  Facilitator:KOFI Ashley, [facility]       Topic:   Morning check in, goal for the day    Intervention:    Group process: support, challenge, affirm, psycho-education.     Response:  Patient did participate in group. Behavior in group was pleasant, engaged. Patient shared that she got better sleep last night than the previous night and today is hoping to discharge.  She is also working on \"trusting the process\" while here and explained to other group members what she meant by this.       Jayme Best, SARAH        "

## 2023-11-14 NOTE — PROGRESS NOTES
"Triage & Transition Services EmPATH     Progress Note    Patient: Antonietta goes by \"Antonietta,\" uses she/her pronouns  Date of Service: November 14, 2023  Site of Service: EmPATH  Patient was seen in-person.     Presenting problem:  Please see initial [facility] Crisis Assessment completed by KOFI Childress LGSW on 11/13/2023 for complete assessment information. Notable concerns include anxiety.     Individuals Present: Antonietta & SARAH Falk    Session start: 0750 / 1230  Session end: 0806 / 1240   Session duration in minutes: 26  CPT utilized: 34468 - Psychotherapy (with patient) - 30 (16-37*) min    Current Presentation:   Patient was watching TV from her recliner when writer approached to introduce self and offer support for the day.  She was ready to meet individually so we went to the consult room to do so.      Patient shares that she slept well, experienced a challenging morning already but did not provide details, and reports feeling \"overall better.\"  She is presenting as calmer with less anxiousness in presentation as compared to yesterday, speech is closer to usual volume, less pressured, and thoughts are linear.  Patient identifies \"leaning into the process and being comfortable with the uncomfortable.\"  We reviewed the discussion last night regarding after care plans of trauma IOP, individual therapy and medication management.  She wants to pursue all of those still and accepted education needing to independently call the trauma IOP.     Patient identifies continued disruption of sleep from nightmares that are one of two common themes:  abandonment and violence.  She was asking if that will ever get better as she was experiencing nightmares while here on EmPATH also.  Writer provided hope that the nightmares can decrease in intensity by utilizing outpatient therapy and exploring medication options with provider.  She reports being able to eat more during her stay now and that is a " sign of improvement.     Patient asked for resources that she can utilize with and for her 11 year old daughter as patient is wanting to protect and support daughter's mental health as well.  Writer provided patient with NANNETTE resource list for online and in-person support groups to utilize as appropriate.     Patient is denying SI, HI, AH, VH, paranoia and delusions.    Writer completed After Care plan with patient and reviewed appointments that we have scheduled for her outpatient follow up.  She appreciated this review and is going to call [facility] intake now to see if she can get an appointment on the books prior to discharge.     [place] Suicide Severity Rating Scale Since Last Contact:   Suicidal Ideation (Since Last Contact)  1. Wish to be Dead (Since Last Contact): No  2. Non-Specific Active Suicidal Thoughts (Since Last Contact): No  Suicidal Behavior (Since Last Contact)  Actual Attempt (Since Last Contact): No  Has subject engaged in non-suicidal self-injurious behavior? (Since Last Contact): No  Interrupted Attempts (Since Last Contact): No  Aborted or Self-Interrupted Attempt (Since Last Contact): No  Preparatory Acts or Behavior (Since Last Contact): No  Suicide (Since Last Contact): No     C-SSRS Risk (Since Last Contact)  Calculated C-SSRS Risk Score (Since Last Contact): No Risk Indicated      Additional Collateral Information:  none     Mental Status Exam     Affect: Appropriate   Appearance: Appropriate    Attention Span/Concentration: Attentive  Eye Contact: Engaged   Fund of Knowledge: Appropriate    Language /Speech Content: Fluent   Language /Speech Volume: Normal    Language /Speech Rate/Productions: Normal    Recent Memory: Intact   Remote Memory: Intact   Mood: Anxious and Normal    Orientation to Person: Yes    Orientation to Place: Yes   Orientation to Time of Day: Yes    Orientation to Date: Yes    Situation (Do they understand why they are here?): Yes    Psychomotor  Behavior: Normal    Thought Content: Clear   Thought Form: Goal Directed and Intact     Diagnosis:   Major depressive disorder, single episode, unspecified F32.9     Anxiety disorder, unspecified F41.9       Therapeutic Intervention(s):   Provided active listening, unconditional positive regard, and validation. Engaged in safety planning.  Engaged in cognitive restructuring/ reframing, looked at common cognitive distortions and challenged negative thoughts. Engaged in guided discovery, explored patient's perspectives and helped expand them through socratic dialogue. Engaged in activity scheduling and behavioral activation, looking at and reviewing the prior week's goals, problem solving any barriers and acknowledging successes, as well as setting new goals. Coached on coping techniques/relaxation skills to help improve distress tolerance and managing intense emotions. Reviewed healthy living that supports positive mental health, including looking at sleep hygiene, regular movement, nutrition, and regular socialization. Provided positive reinforcement for progress towards goals, gains in knowledge, and application of skills previously taught.  Worked on relapse prevention planning (review of stressors, early warning signs, written plan to respond to signs, and rehearse plan). Identified and practiced coping skills. Engaged in relaxation training (e.g. meditation, progressive muscle relaxation, etc.). Identified stress relief practices.    Treatment Objective(s) Addressed:   The focus of this session was on rapport building, orienting the patient to therapy, identifying and practicing coping strategies, processing feelings related to anxiety management, safety planning, identifying an appropriate aftercare plan, assessing safety, and identifying additional supports.     Progress Towards Goals:   Patient reports improving symptoms. Patient is making progress towards treatment goals as evidenced by being able to eat  food, reduced anxiety levels, and comfort in knowing future outpatient appointments are scheduled.     Case Management:   none     Plan and Clinical Summary and Substantiation of Recommendations:   Discharge: Patient is identifying improved overall mental health as she is able to eat food again, anxiety is decreased, yet still experiencing some nightmares while sleeping which cause distress upon waking.  Patient has a brighter affect than yesterday, speech is more baseline with normal volume, eye contact is engaged, thoughts are future and goal oriented.  Patient is hopeful that the outpatient plans we have established with her will be enough to continue this recovery journey from anxiety.  She expresses appreciation for the care she has received here.     Attending concurs with disposition: Yes         SARAH Falk

## 2023-11-14 NOTE — ED NOTES
Continues to have bouts of anxiety tonight. Had doses of propranolol and seroquel which were somewhat helpful (seroquel seemed more helpful than propranolol) . Appetite definitely improving-with increased food intake-dizziness has subsided. ( In addition -no orthostatic change in blood pressure) Contracts for safety here on Empath.

## 2023-11-14 NOTE — PROGRESS NOTES
"Triage & Transition Services [facility]     Progress Note    Patient: Antonietta goes by \"Antonietta,\" uses she/her pronouns  Date of Service: November 13, 2023  Site of Service:   Patient was seen in-person.     Presenting problem:  Please see initial [facility] Crisis Assessment completed by JEREMIE Childress on 11/13 for complete assessment information. Notable concerns include anxiety.     Individuals Present: Antonietta & Bruna Amor    Session start: 1745  Session end: 1835  Session duration in minutes: 50  CPT utilized: 48516 - Psychotherapy (with patient) - 45 (38-52*) min    Current Presentation:   This writer met with the patient in the conference room. She was cooperative and engaged. She discussed ongoing anxiety and stressors that let to her presentation to the hospitalization. She feels the medications have been helpful however has concerns that it will not be enough to prevent the continued increase state of anxiety. She comes to the meeting with a list of 6 questions for the writer. She asks these questions in order, \"1. I have been feeling more weepy/ angry then bleh, will this every stop being so high to low? 2. Do you have any recommendations how to regulate through this? Everything is a trigger. 3.Do you think Caffeine contributes to this? 4. What do you think would be helpful for this low period or would get me to look forward to the future? 5. Do you have recommendations for literature or support groups for this?\" Writer provided support regarding her questions, redirection to psychiatry as indicated for medication recommendations, and discussed focusing on sleep/ appetite overnight and to check in in the morning regarding levels of anxiety. The patient reports that she was able to eat today which she has previously been unable to do. She feels the medication has been beneficial, but feels there is more anxiety that could be addressed with coping skills.  She raised concern about not being able to " tell when she is ready to leave the ED. Writer provided further information about the observation status and stay on the [facility] unit. The patient thanked writer for her support and information during the session.      Mental Status Exam     Affect: Appropriate   Appearance: Appropriate    Attention Span/Concentration: Attentive  Eye Contact: Engaged   Fund of Knowledge: Advanced    Language /Speech Content: Fluent   Language /Speech Volume: Normal    Language /Speech Rate/Productions: Articulate    Recent Memory: Intact   Remote Memory: Intact   Mood: Depressed    Orientation to Person: Yes    Orientation to Place: Yes   Orientation to Time of Day: Yes    Orientation to Date: Yes    Situation (Do they understand why they are here?): Yes    Psychomotor Behavior: Normal    Thought Content: Clear   Thought Form: Goal Directed     Diagnosis:   Active Hospital Problems    Major depressive disorder, single episode, unspecified       *Anxiety disorder, unspecified     Therapeutic Intervention(s):   Provided active listening, unconditional positive regard, and validation. Engaged in guided discovery, explored patient's perspectives and helped expand them through socratic dialogue. Engaged in activity scheduling and behavioral activation, looking at and reviewing the prior week's goals, problem solving any barriers and acknowledging successes, as well as setting new goals. Taught the link between thoughts, feelings, and behaviors.    Treatment Objective(s) Addressed:   The focus of this session was on rapport building, orienting the patient to therapy, safety planning, assessing safety, identifying treatment goals, and identifying additional supports .     Progress Towards Goals:   Patient reports improving symptoms. Patient is making progress towards treatment goals as evidenced by increase in appetite and sleep, decrease in anxiety symptoms.     Plan and Clinical Summary and Substantiation of Recommendations:    Observation: A lower level of care has been unsuccessful in treating and stabilizing patient s mental health symptoms. [facility] monitoring, treatment and therapeutic intervention of mental health symptoms. Observation at [facility] could help mitigate the need for a more restrictive level of care in an inpatient setting.  The patient would benefit from resources for NARM and narrative exposure therapy. She could additionally benefit from [facility] IOP if she would be in agreement with, she is currently on an JESUS from her job and is willing to engage in programmatic care. She requests with her referral for therapy to be with someone that has the capacity to increase to weekly or twice a week sessions.     Attending concurs with disposition: Yes      LORENZA Aceves MSW- Intern

## 2023-11-15 ENCOUNTER — PATIENT OUTREACH (OUTPATIENT)
Dept: FAMILY MEDICINE | Facility: CLINIC | Age: 33
End: 2023-11-15
Payer: COMMERCIAL

## 2023-11-15 NOTE — TELEPHONE ENCOUNTER
ED / Discharge Outreach Protocol    Patient Contact    Attempt # 1    Was call answered?  No.  Left message on voicemail with information to call me back.    Jagruti Ross RN

## 2024-03-26 ENCOUNTER — TELEPHONE (OUTPATIENT)
Dept: FAMILY MEDICINE | Facility: CLINIC | Age: 34
End: 2024-03-26
Payer: COMMERCIAL

## 2024-03-26 NOTE — TELEPHONE ENCOUNTER
Dr. Mckeon -  we are cleaning up a backlog of Zio patch orders. Patch ordered on 11/9 and patient then seen for two following ED visits at the [facility] unit for anxiety and panic attacks. Do you still feel a Zio patch is warranted for this patient given time since order was placed? If so, we will reach out. If not, you may discontinue the orders.    JADE ShannonN, RN  [facility]

## 2024-06-16 ENCOUNTER — HEALTH MAINTENANCE LETTER (OUTPATIENT)
Age: 34
End: 2024-06-16

## 2025-06-21 ENCOUNTER — HEALTH MAINTENANCE LETTER (OUTPATIENT)
Age: 35
End: 2025-06-21